# Patient Record
Sex: FEMALE | Race: WHITE | NOT HISPANIC OR LATINO | Employment: OTHER | URBAN - METROPOLITAN AREA
[De-identification: names, ages, dates, MRNs, and addresses within clinical notes are randomized per-mention and may not be internally consistent; named-entity substitution may affect disease eponyms.]

---

## 2018-06-26 ENCOUNTER — TRANSFERRED RECORDS (OUTPATIENT)
Dept: HEALTH INFORMATION MANAGEMENT | Facility: CLINIC | Age: 83
End: 2018-06-26

## 2018-06-27 ENCOUNTER — DOCUMENTATION ONLY (OUTPATIENT)
Dept: OTHER | Facility: CLINIC | Age: 83
End: 2018-06-27

## 2018-06-27 DIAGNOSIS — Z71.89 ADVANCED DIRECTIVES, COUNSELING/DISCUSSION: Chronic | ICD-10-CM

## 2018-08-21 ENCOUNTER — DOCUMENTATION ONLY (OUTPATIENT)
Dept: OTHER | Facility: CLINIC | Age: 83
End: 2018-08-21

## 2018-08-21 ENCOUNTER — HOSPITAL ENCOUNTER (OUTPATIENT)
Dept: NUCLEAR MEDICINE | Facility: CLINIC | Age: 83
Setting detail: NUCLEAR MEDICINE
Discharge: HOME | End: 2018-08-22
Attending: SPECIALIST | Admitting: SPECIALIST
Payer: COMMERCIAL

## 2018-08-21 DIAGNOSIS — E04.2 MULTINODULAR GOITER: ICD-10-CM

## 2018-08-21 DIAGNOSIS — E05.90 HYPERTHYROIDISM: ICD-10-CM

## 2018-08-21 PROBLEM — Z71.89 ADVANCED DIRECTIVES, COUNSELING/DISCUSSION: Chronic | Status: RESOLVED | Noted: 2018-06-27 | Resolved: 2018-08-21

## 2018-08-21 PROCEDURE — 34300033 ZZH RX 343

## 2018-08-21 PROCEDURE — A9516 IODINE I-123 SOD IODIDE MIC: HCPCS

## 2018-08-21 RX ADMIN — Medication 230 UCI.: at 13:30

## 2018-08-22 ENCOUNTER — HOSPITAL ENCOUNTER (OUTPATIENT)
Dept: NUCLEAR MEDICINE | Facility: CLINIC | Age: 83
Setting detail: NUCLEAR MEDICINE
End: 2018-08-22
Attending: SPECIALIST
Payer: COMMERCIAL

## 2018-08-22 PROCEDURE — 78014 THYROID IMAGING W/BLOOD FLOW: CPT

## 2018-09-04 ENCOUNTER — HOSPITAL ENCOUNTER (OUTPATIENT)
Dept: ULTRASOUND IMAGING | Facility: CLINIC | Age: 83
Discharge: HOME | End: 2018-09-04
Attending: PSYCHIATRY & NEUROLOGY | Admitting: PSYCHIATRY & NEUROLOGY
Payer: MEDICAID

## 2018-09-04 DIAGNOSIS — F01.50 VASCULAR DEMENTIA WITHOUT BEHAVIORAL DISTURBANCE (H): ICD-10-CM

## 2018-09-04 DIAGNOSIS — E88.9 TREMOR DUE TO METABOLIC DISORDER: ICD-10-CM

## 2018-09-04 DIAGNOSIS — R26.9 GAIT DISTURBANCE: ICD-10-CM

## 2018-09-04 DIAGNOSIS — R25.1 TREMOR DUE TO METABOLIC DISORDER: ICD-10-CM

## 2018-09-04 PROCEDURE — 93880 EXTRACRANIAL BILAT STUDY: CPT

## 2019-01-12 ENCOUNTER — APPOINTMENT (OUTPATIENT)
Dept: GENERAL RADIOLOGY | Facility: CLINIC | Age: 84
End: 2019-01-12
Payer: COMMERCIAL

## 2019-01-12 ENCOUNTER — HOSPITAL ENCOUNTER (INPATIENT)
Facility: CLINIC | Age: 84
LOS: 9 days | Discharge: NURSING, REHAB OR LONG-TERM CARE | End: 2019-01-24
Attending: EMERGENCY MEDICINE | Admitting: INTERNAL MEDICINE
Payer: COMMERCIAL

## 2019-01-12 DIAGNOSIS — S32.592A CLOSED FRACTURE OF RAMUS OF LEFT PUBIS, INITIAL ENCOUNTER (H): ICD-10-CM

## 2019-01-12 DIAGNOSIS — R11.2 NAUSEA AND VOMITING, INTRACTABILITY OF VOMITING NOT SPECIFIED, UNSPECIFIED VOMITING TYPE: ICD-10-CM

## 2019-01-12 DIAGNOSIS — T07.XXXA MULTIPLE FRACTURES: Primary | ICD-10-CM

## 2019-01-12 DIAGNOSIS — S42.202A CLOSED FRACTURE OF PROXIMAL END OF LEFT HUMERUS, UNSPECIFIED FRACTURE MORPHOLOGY, INITIAL ENCOUNTER: ICD-10-CM

## 2019-01-12 DIAGNOSIS — S52.502A CLOSED FRACTURE OF DISTAL END OF LEFT RADIUS, UNSPECIFIED FRACTURE MORPHOLOGY, INITIAL ENCOUNTER: ICD-10-CM

## 2019-01-12 DIAGNOSIS — K59.03 DRUG-INDUCED CONSTIPATION: ICD-10-CM

## 2019-01-12 DIAGNOSIS — S52.602A CLOSED FRACTURE OF DISTAL END OF LEFT ULNA, UNSPECIFIED FRACTURE MORPHOLOGY, INITIAL ENCOUNTER: ICD-10-CM

## 2019-01-12 DIAGNOSIS — D62 ANEMIA DUE TO BLOOD LOSS, ACUTE: ICD-10-CM

## 2019-01-12 LAB
ANION GAP SERPL CALCULATED.3IONS-SCNC: 9 MMOL/L (ref 3–14)
BASOPHILS # BLD AUTO: 0 10E9/L (ref 0–0.2)
BASOPHILS NFR BLD AUTO: 0.1 %
BUN SERPL-MCNC: 18 MG/DL (ref 7–30)
CALCIUM SERPL-MCNC: 8.2 MG/DL (ref 8.5–10.1)
CHLORIDE SERPL-SCNC: 104 MMOL/L (ref 94–109)
CO2 SERPL-SCNC: 27 MMOL/L (ref 20–32)
CREAT SERPL-MCNC: 0.66 MG/DL (ref 0.52–1.04)
DIFFERENTIAL METHOD BLD: ABNORMAL
EOSINOPHIL # BLD AUTO: 0.1 10E9/L (ref 0–0.7)
EOSINOPHIL NFR BLD AUTO: 0.3 %
ERYTHROCYTE [DISTWIDTH] IN BLOOD BY AUTOMATED COUNT: 14.5 % (ref 10–15)
GFR SERPL CREATININE-BSD FRML MDRD: 82 ML/MIN/{1.73_M2}
GLUCOSE SERPL-MCNC: 138 MG/DL (ref 70–99)
HCT VFR BLD AUTO: 32 % (ref 35–47)
HGB BLD-MCNC: 10.5 G/DL (ref 11.7–15.7)
IMM GRANULOCYTES # BLD: 0.1 10E9/L (ref 0–0.4)
IMM GRANULOCYTES NFR BLD: 1 %
LYMPHOCYTES # BLD AUTO: 0.8 10E9/L (ref 0.8–5.3)
LYMPHOCYTES NFR BLD AUTO: 5.6 %
MCH RBC QN AUTO: 28.5 PG (ref 26.5–33)
MCHC RBC AUTO-ENTMCNC: 32.8 G/DL (ref 31.5–36.5)
MCV RBC AUTO: 87 FL (ref 78–100)
MONOCYTES # BLD AUTO: 0.9 10E9/L (ref 0–1.3)
MONOCYTES NFR BLD AUTO: 6.3 %
NEUTROPHILS # BLD AUTO: 12.7 10E9/L (ref 1.6–8.3)
NEUTROPHILS NFR BLD AUTO: 86.7 %
NRBC # BLD AUTO: 0 10*3/UL
NRBC BLD AUTO-RTO: 0 /100
PLATELET # BLD AUTO: 223 10E9/L (ref 150–450)
POTASSIUM SERPL-SCNC: 3.5 MMOL/L (ref 3.4–5.3)
RBC # BLD AUTO: 3.69 10E12/L (ref 3.8–5.2)
SODIUM SERPL-SCNC: 140 MMOL/L (ref 133–144)
WBC # BLD AUTO: 14.7 10E9/L (ref 4–11)

## 2019-01-12 PROCEDURE — 0PSJXZZ REPOSITION LEFT RADIUS, EXTERNAL APPROACH: ICD-10-PCS | Performed by: EMERGENCY MEDICINE

## 2019-01-12 PROCEDURE — 99225 ZZC SUBSEQUENT OBSERVATION CARE,LEVEL II: CPT | Performed by: INTERNAL MEDICINE

## 2019-01-12 PROCEDURE — 99207 ZZC CDG-HISTORY COMP: MEETS EXP. PROBLEM FOCUSED - DOWN CODED LACK OF HPI: CPT | Performed by: INTERNAL MEDICINE

## 2019-01-12 PROCEDURE — 96374 THER/PROPH/DIAG INJ IV PUSH: CPT

## 2019-01-12 PROCEDURE — 99285 EMERGENCY DEPT VISIT HI MDM: CPT | Mod: 25

## 2019-01-12 PROCEDURE — 85025 COMPLETE CBC W/AUTO DIFF WBC: CPT | Performed by: EMERGENCY MEDICINE

## 2019-01-12 PROCEDURE — 96361 HYDRATE IV INFUSION ADD-ON: CPT

## 2019-01-12 PROCEDURE — G0378 HOSPITAL OBSERVATION PER HR: HCPCS

## 2019-01-12 PROCEDURE — 25000128 H RX IP 250 OP 636: Performed by: EMERGENCY MEDICINE

## 2019-01-12 PROCEDURE — 25530 CLTX ULNAR SHFT FX W/O MNPJ: CPT | Mod: LT

## 2019-01-12 PROCEDURE — 24500 CLTX HUMRL SHFT FX W/O MNPJ: CPT | Mod: LT

## 2019-01-12 PROCEDURE — 25505 CLTX RDL SHFT FX W/MNPJ: CPT | Mod: LT

## 2019-01-12 PROCEDURE — 96375 TX/PRO/DX INJ NEW DRUG ADDON: CPT

## 2019-01-12 PROCEDURE — 96376 TX/PRO/DX INJ SAME DRUG ADON: CPT

## 2019-01-12 PROCEDURE — 80048 BASIC METABOLIC PNL TOTAL CA: CPT | Performed by: EMERGENCY MEDICINE

## 2019-01-12 PROCEDURE — 25000128 H RX IP 250 OP 636: Performed by: INTERNAL MEDICINE

## 2019-01-12 PROCEDURE — 73502 X-RAY EXAM HIP UNI 2-3 VIEWS: CPT

## 2019-01-12 PROCEDURE — 25000132 ZZH RX MED GY IP 250 OP 250 PS 637: Performed by: INTERNAL MEDICINE

## 2019-01-12 PROCEDURE — 73080 X-RAY EXAM OF ELBOW: CPT | Mod: LT

## 2019-01-12 PROCEDURE — 73030 X-RAY EXAM OF SHOULDER: CPT | Mod: LT

## 2019-01-12 PROCEDURE — 73110 X-RAY EXAM OF WRIST: CPT | Mod: LT

## 2019-01-12 PROCEDURE — 40000986 XR WRIST LT  2 VW: Mod: LT

## 2019-01-12 RX ORDER — HYDROMORPHONE HYDROCHLORIDE 1 MG/ML
0.3 INJECTION, SOLUTION INTRAMUSCULAR; INTRAVENOUS; SUBCUTANEOUS ONCE
Status: COMPLETED | OUTPATIENT
Start: 2019-01-12 | End: 2019-01-12

## 2019-01-12 RX ORDER — SODIUM CHLORIDE 9 MG/ML
INJECTION, SOLUTION INTRAVENOUS CONTINUOUS
Status: DISCONTINUED | OUTPATIENT
Start: 2019-01-12 | End: 2019-01-15

## 2019-01-12 RX ORDER — OXYBUTYNIN CHLORIDE 5 MG/1
5 TABLET ORAL
Status: ON HOLD | COMMUNITY
End: 2019-01-21

## 2019-01-12 RX ORDER — BISACODYL 10 MG
10 SUPPOSITORY, RECTAL RECTAL DAILY PRN
Status: DISCONTINUED | OUTPATIENT
Start: 2019-01-12 | End: 2019-01-24 | Stop reason: HOSPADM

## 2019-01-12 RX ORDER — ONDANSETRON 4 MG/1
4 TABLET, ORALLY DISINTEGRATING ORAL EVERY 6 HOURS PRN
Status: DISCONTINUED | OUTPATIENT
Start: 2019-01-12 | End: 2019-01-24 | Stop reason: HOSPADM

## 2019-01-12 RX ORDER — TRAMADOL HYDROCHLORIDE 50 MG/1
100 TABLET ORAL EVERY 6 HOURS PRN
Status: DISCONTINUED | OUTPATIENT
Start: 2019-01-12 | End: 2019-01-14

## 2019-01-12 RX ORDER — ONDANSETRON 2 MG/ML
4 INJECTION INTRAMUSCULAR; INTRAVENOUS ONCE
Status: COMPLETED | OUTPATIENT
Start: 2019-01-12 | End: 2019-01-12

## 2019-01-12 RX ORDER — ACETAMINOPHEN 650 MG/1
650 SUPPOSITORY RECTAL EVERY 4 HOURS PRN
Status: DISCONTINUED | OUTPATIENT
Start: 2019-01-12 | End: 2019-01-14

## 2019-01-12 RX ORDER — LISINOPRIL 20 MG/1
20 TABLET ORAL EVERY EVENING
COMMUNITY

## 2019-01-12 RX ORDER — HYDRALAZINE HYDROCHLORIDE 20 MG/ML
10 INJECTION INTRAMUSCULAR; INTRAVENOUS EVERY 4 HOURS PRN
Status: DISCONTINUED | OUTPATIENT
Start: 2019-01-12 | End: 2019-01-24 | Stop reason: HOSPADM

## 2019-01-12 RX ORDER — NALOXONE HYDROCHLORIDE 0.4 MG/ML
.1-.4 INJECTION, SOLUTION INTRAMUSCULAR; INTRAVENOUS; SUBCUTANEOUS
Status: DISCONTINUED | OUTPATIENT
Start: 2019-01-12 | End: 2019-01-24 | Stop reason: HOSPADM

## 2019-01-12 RX ORDER — ACETAMINOPHEN 325 MG/1
650 TABLET ORAL EVERY 4 HOURS PRN
Status: DISCONTINUED | OUTPATIENT
Start: 2019-01-12 | End: 2019-01-14

## 2019-01-12 RX ORDER — ONDANSETRON 2 MG/ML
4 INJECTION INTRAMUSCULAR; INTRAVENOUS EVERY 6 HOURS PRN
Status: DISCONTINUED | OUTPATIENT
Start: 2019-01-12 | End: 2019-01-24 | Stop reason: HOSPADM

## 2019-01-12 RX ORDER — ACETAMINOPHEN 500 MG
1000 TABLET ORAL EVERY 8 HOURS
Status: DISCONTINUED | OUTPATIENT
Start: 2019-01-12 | End: 2019-01-24 | Stop reason: HOSPADM

## 2019-01-12 RX ORDER — POLYETHYLENE GLYCOL 3350 17 G/17G
17 POWDER, FOR SOLUTION ORAL DAILY PRN
Status: DISCONTINUED | OUTPATIENT
Start: 2019-01-12 | End: 2019-01-13

## 2019-01-12 RX ORDER — METHIMAZOLE 5 MG/1
5 TABLET ORAL
COMMUNITY

## 2019-01-12 RX ORDER — CITALOPRAM HYDROBROMIDE 10 MG/1
10 TABLET ORAL EVERY EVENING
COMMUNITY

## 2019-01-12 RX ADMIN — HYDROMORPHONE HYDROCHLORIDE 0.3 MG: 1 INJECTION, SOLUTION INTRAMUSCULAR; INTRAVENOUS; SUBCUTANEOUS at 17:05

## 2019-01-12 RX ADMIN — ACETAMINOPHEN 650 MG: 325 TABLET, FILM COATED ORAL at 23:08

## 2019-01-12 RX ADMIN — ONDANSETRON 4 MG: 2 INJECTION INTRAMUSCULAR; INTRAVENOUS at 19:54

## 2019-01-12 RX ADMIN — ONDANSETRON 4 MG: 2 INJECTION INTRAMUSCULAR; INTRAVENOUS at 17:58

## 2019-01-12 RX ADMIN — SODIUM CHLORIDE: 9 INJECTION, SOLUTION INTRAVENOUS at 21:38

## 2019-01-12 SDOH — HEALTH STABILITY: MENTAL HEALTH: HOW OFTEN DO YOU HAVE A DRINK CONTAINING ALCOHOL?: NEVER

## 2019-01-12 NOTE — ED PROVIDER NOTES
History     Chief Complaint:  Fall and Wrist Injury     HPI The history is limited due to the patient's dementia and is obtained through Farsi language interpretations provided by the patient's daughter.     Renetta Baker is a right-handed 83 year old female with a history of dementia and hypertension who presents via EMS accompanied by her daughter for evaluation of a left upper extremity injury and hip pain following a fall. Today shortly prior to arrival, the patient was at her home when she had an unwitnessed fall down 2-3 carpeted stairs injuring her left wrist, left shoulder, left elbow, left hip, and back. She sustained an injury with obvious deformity to the left wrist in the fall. She does not believe that she struck her head or lost consciousness in the fall. The patient's daughter found her shortly after the fall and called EMS to bring her into the ED for evaluation. She was provided Fentanyl and Zofran prior to arrival in the ED. She is not currently anticoagulated.     Allergies:  NKDA      Medications:    citalopram (CELEXA) 10 MG tablet  lisinopril (PRINIVIL/ZESTRIL) 20 MG tablet  methimazole (TAPAZOLE) 5 MG tablet  oxybutynin (DITROPAN) 5 MG tablet    Past Medical History:    Dementia  Hypertension   Osteoporosis     Past Surgical History:    History reviewed. No pertinent past surgical history.     Family History:    History reviewed. No pertinent family history.     Social History:  Tobacco use:    Never smoker   Alcohol use:    Negative   Marital status:       Accompanied to ED by:  EMS and daughter      Review of Systems   Unable to perform ROS: Dementia     Physical Exam   First Vitals:  BP: 169/81  Pulse: 63  Temp: 98.1  F (36.7  C)  Resp: 16  SpO2: 97 %    Physical Exam  General: Alert and cooperative with exam. Patient in moderate distress.  Baseline mentation.  Head:  Scalp is NC/AT  Eyes:  No scleral icterus, PERRL  ENT:  The external nose and ears are normal. The oropharynx  is normal and without erythema; mucus membranes are moist. Uvula midline, no evidence of deep space infection.  Neck:  Normal range of motion without rigidity.  CV:  Regular rate and rhythm  Resp:  Breath sounds are clear bilaterally    Non-labored, no retractions or accessory muscle use  GI:  Abdomen is soft, no distension, no tenderness. No peritoneal signs  MS:  Tenderness to palpation over left lateral hip, left shoulder, left elbow, and left wrist with obvious left wrist deformity. CMS intact to all four extremities.  Compartments soft and compressible.  Skin:  Warm and dry, No rash or lesions noted.  Neuro: Alert and cooperative.  No gross motor deficits. CN 2-12 intact    Emergency Department Course     Imaging:  Radiographic findings were communicated with the family who voiced understanding of the findings.    Elbow XR, Left:  IMPRESSION: Mild cortical irregularity along the radial head laterally. This could just be degenerative change. It is difficult to  completely exclude a small fracture in this region. There are other areas of degenerative osteophytes at the elbow.  Preliminary report per radiology.     Wrist XR, Left:  IMPRESSION: Acute impacted fracture of the distal left radius. Mild posterior angulation of the distal fragment. Acute fracture of the ulnar styloid.  Preliminary report per radiology.     Shoulder XR, Left:  IMPRESSION: Acute fracture through the proximal humeral metadiaphysis and humeral head. Impacted fracture fragments. No convincing dislocation.  Preliminary report per radiology.     XR Pelvis w Hip, Left:  IMPRESSION: There is cortical irregularity along the superior left pubic ramus. As such, cannot exclude fracture at this location. Both  hips appear located. Left proximal femoral surgical change appears anatomic without evidence for hardware failure.  Preliminary report per radiology.     Laboratory:  CBC: WBC 14.7 high, HGB 10.5 low, o/w WNL ()   BMP: Calcium 8.2 low,  Glucose 138 high, o/w WNL (Creatinine 0.66)    Procedures:  Procedure Note: Hematoma block:  PROCEDURE: Hematoma block left distal radius fracture    INDICATION:  Fracture distal radius    PHYSICIAN:  Aftab Almaguer DO  DESCRIPTION OF PROCEDURE:    Informed consent. Site was correctly identified. Anesthesia was obtained with 0.5% Bupivacaine without epinephrine, local infiltration.  Using a 18 gauge needle in standard fashion the distal radius fracture site was entered and 5cc of 0.5% bupivacaine introduced.  Patient tolerated the procedure well, no complications.      Procedure Note: Reduction of Fractured left distal radius  Physician: Aftab Almaguer DO   Diagnosis: Left distal radius fracture   Consent: Informed written, see paper chart  Description of Procedure: Consent as above.  Patient positioned in supine position.  Hematoma block was utilized, see above note.  The arm was grasped above and below fracture.  Recreating mechanism of injury the fracture area was reduced successfully.   The neurovascular exam was normal before and after reduction attempt.  The patient tolerated the procedure well, there were no complications.      Procedure Note: Splint Placement  Physician: Aftab Almaguer DO   Diagnosis: Left distal radius fracture   Consent: Informed written, see paper chart  Description of Procedure:  Following reduction of wrist fracture a sugar tong splint was applied (plaster) to the left upper extremity.  The elbow was maintained at 90 degrees flexion.  The neurovascular exam was normal before and after splint placement.  The patient tolerated the procedure well, there were no complications. A sling was applied.     Interventions:  1705 Dilaudid 0.3 mg IV   1758 Zofran 4 mg IV   1954 Zofran 4 mg IV     Emergency Department Course:  Patient was brought to the ED via EMS.     Nursing notes and vitals reviewed.  1652: I performed an exam of the patient as documented above.     1753: I  reassessed the patient and updated the family.    1800:  A hematoma block was performed as outlined in the procedure note above.  The patient tolerated well and there were no complications.      1856:  A fracture reduction and splint placement were performed as outlined in the procedure notes above.  The patient tolerated well and there were no complications.     1918: I spoke with Dr. Sosa of the hospitalist service regarding patient's presentation, findings, and plan of care.     Findings and plan explained to the Patient and family who consents to admission. Discussed the patient with Dr. Sosa, who will admit the patient to an obs bed for further monitoring, evaluation, and treatment.     Impression & Plan      Medical Decision Making:  Renetta Baker is a 83 year old female with a history of dementia who presents status post mechanical fall with associated left wrist/shoulder pain and left hip pain.  The patient's medical history and records were reviewed.  Initial consideration for, but not limited to, fracture, dislocation, soft tissue injury, among others.  Patient's neurologic exam is at baseline and there is no evidence of head injury; no indication for advanced head imaging at this time.  X-rays demonstrated proximal humerus fracture, ulnar styloid fracture, distal radius fracture, and likely pubic rami fracture as well as possible radial head fracture.  Patient was provided Dilaudid for additional pain control as well as Zofran for repeated episodes of emesis.  Baseline labs obtained demonstrate elevated white count; likely secondary to demargination from stress/pain reaction.  Hematoma block was performed for the left wrist fracture; reduction and splinting performed as noted above.  Given multiple fractures and need for ongoing pain and nausea management as well as need for likely TCU placement the patient will be admitted to observation with the hospitalist service for further evaluation and care.   No indication for orthopedic consultation in the ED.    Diagnosis:    ICD-10-CM    1. Closed fracture of proximal end of left humerus, unspecified fracture morphology, initial encounter S42.202A    2. Closed fracture of ramus of left pubis, initial encounter (H) S32.592A    3. Closed fracture of distal end of left radius, unspecified fracture morphology, initial encounter S52.502A    4. Closed fracture of distal end of left ulna, unspecified fracture morphology, initial encounter S52.602A    5. Nausea and vomiting, intractability of vomiting not specified, unspecified vomiting type R11.2        Disposition:  Admitted to obs with Dr. Sosa.       I, Juan Weber, am serving as a scribe at 4:53 PM on 1/12/2019 to document services personally performed by Aftab Almaguer DO based on my observations and the provider's statements to me.     EMERGENCY DEPARTMENT       Aftab Almaguer DO  01/12/19 2151

## 2019-01-12 NOTE — ED NOTES
Bed: [facility]  Expected date: 1/12/19  Expected time: 4:13 PM  Means of arrival: Ambulance  Comments:  [place] 83f ball, back, wrist pain ETA 1627

## 2019-01-13 ENCOUNTER — APPOINTMENT (OUTPATIENT)
Dept: PHYSICAL THERAPY | Facility: CLINIC | Age: 84
End: 2019-01-13
Payer: COMMERCIAL

## 2019-01-13 LAB
ANION GAP SERPL CALCULATED.3IONS-SCNC: 8 MMOL/L (ref 3–14)
BUN SERPL-MCNC: 26 MG/DL (ref 7–30)
CALCIUM SERPL-MCNC: 7.7 MG/DL (ref 8.5–10.1)
CHLORIDE SERPL-SCNC: 108 MMOL/L (ref 94–109)
CO2 SERPL-SCNC: 25 MMOL/L (ref 20–32)
CREAT SERPL-MCNC: 0.85 MG/DL (ref 0.52–1.04)
ERYTHROCYTE [DISTWIDTH] IN BLOOD BY AUTOMATED COUNT: 14.7 % (ref 10–15)
GFR SERPL CREATININE-BSD FRML MDRD: 64 ML/MIN/{1.73_M2}
GLUCOSE SERPL-MCNC: 131 MG/DL (ref 70–99)
HCT VFR BLD AUTO: 25.3 % (ref 35–47)
HGB BLD-MCNC: 8.5 G/DL (ref 11.7–15.7)
MCH RBC QN AUTO: 28.7 PG (ref 26.5–33)
MCHC RBC AUTO-ENTMCNC: 33.6 G/DL (ref 31.5–36.5)
MCV RBC AUTO: 86 FL (ref 78–100)
PLATELET # BLD AUTO: 161 10E9/L (ref 150–450)
POTASSIUM SERPL-SCNC: 4.5 MMOL/L (ref 3.4–5.3)
RBC # BLD AUTO: 2.96 10E12/L (ref 3.8–5.2)
SODIUM SERPL-SCNC: 141 MMOL/L (ref 133–144)
WBC # BLD AUTO: 7 10E9/L (ref 4–11)

## 2019-01-13 PROCEDURE — 85027 COMPLETE CBC AUTOMATED: CPT | Performed by: INTERNAL MEDICINE

## 2019-01-13 PROCEDURE — 97162 PT EVAL MOD COMPLEX 30 MIN: CPT | Mod: GP

## 2019-01-13 PROCEDURE — 99207 ZZC CDG-MDM COMPONENT: MEETS LOW - DOWN CODED: CPT | Performed by: INTERNAL MEDICINE

## 2019-01-13 PROCEDURE — 36415 COLL VENOUS BLD VENIPUNCTURE: CPT | Performed by: INTERNAL MEDICINE

## 2019-01-13 PROCEDURE — 25000132 ZZH RX MED GY IP 250 OP 250 PS 637: Performed by: INTERNAL MEDICINE

## 2019-01-13 PROCEDURE — 40000193 ZZH STATISTIC PT WARD VISIT

## 2019-01-13 PROCEDURE — 80048 BASIC METABOLIC PNL TOTAL CA: CPT | Performed by: INTERNAL MEDICINE

## 2019-01-13 PROCEDURE — 99225 ZZC SUBSEQUENT OBSERVATION CARE,LEVEL II: CPT | Performed by: INTERNAL MEDICINE

## 2019-01-13 PROCEDURE — 25000132 ZZH RX MED GY IP 250 OP 250 PS 637: Performed by: SURGERY

## 2019-01-13 PROCEDURE — 96361 HYDRATE IV INFUSION ADD-ON: CPT

## 2019-01-13 PROCEDURE — G0378 HOSPITAL OBSERVATION PER HR: HCPCS

## 2019-01-13 PROCEDURE — 25000128 H RX IP 250 OP 636: Performed by: INTERNAL MEDICINE

## 2019-01-13 RX ORDER — POLYETHYLENE GLYCOL 3350 17 G/17G
17 POWDER, FOR SOLUTION ORAL DAILY
Status: DISCONTINUED | OUTPATIENT
Start: 2019-01-13 | End: 2019-01-13

## 2019-01-13 RX ORDER — POLYETHYLENE GLYCOL 3350 17 G/17G
17 POWDER, FOR SOLUTION ORAL DAILY
Status: DISCONTINUED | OUTPATIENT
Start: 2019-01-13 | End: 2019-01-24 | Stop reason: HOSPADM

## 2019-01-13 RX ADMIN — ACETAMINOPHEN 1000 MG: 500 TABLET, FILM COATED ORAL at 05:54

## 2019-01-13 RX ADMIN — RANITIDINE 150 MG: 150 TABLET ORAL at 12:06

## 2019-01-13 RX ADMIN — ACETAMINOPHEN 650 MG: 325 TABLET, FILM COATED ORAL at 12:26

## 2019-01-13 RX ADMIN — SODIUM CHLORIDE: 9 INJECTION, SOLUTION INTRAVENOUS at 08:54

## 2019-01-13 RX ADMIN — TRAMADOL HYDROCHLORIDE 100 MG: 50 TABLET, COATED ORAL at 21:08

## 2019-01-13 RX ADMIN — RANITIDINE 150 MG: 150 TABLET ORAL at 21:03

## 2019-01-13 RX ADMIN — ACETAMINOPHEN 1000 MG: 500 TABLET, FILM COATED ORAL at 15:11

## 2019-01-13 NOTE — PROGRESS NOTES
Alert. Farsi speaking,  requested for AM. L arm/wrist in splint and sling. Pain controlled with tylenol, declined tramadol. /hr, NPO, ortho/trauma consults, PT, requests female aids only, continue to monitor

## 2019-01-13 NOTE — PHARMACY-ADMISSION MEDICATION HISTORY
Admission medication history interview status for the 1/12/2019  admission is complete. See EPIC admission navigator for prior to admission medications     Medication history source reliability:Good- daughter    Actions taken by pharmacist (provider contacted, etc):None     Additional medication history information not noted on PTA med list :None    Medication reconciliation/reorder completed by provider prior to medication history? No    Time spent in this activity: 10 min    Prior to Admission medications    Medication Sig Last Dose Taking? Auth Provider   ALENDRONATE SODIUM PO Take by mouth once a week 1/8/2019 Yes Unknown, Entered By History   citalopram (CELEXA) 10 MG tablet Take 10 mg by mouth every evening  1/11/2019 at pm Yes Reported, Patient   lisinopril (PRINIVIL/ZESTRIL) 20 MG tablet Take 20 mg by mouth every evening  1/11/2019 at pm Yes Reported, Patient   methimazole (TAPAZOLE) 5 MG tablet Take 5 mg by mouth every morning (before breakfast)  1/12/2019 at am Yes Reported, Patient   oxybutynin (DITROPAN) 5 MG tablet Take 5 mg by mouth every morning (before breakfast)  1/12/2019 at am Yes Reported, Patient

## 2019-01-13 NOTE — PROGRESS NOTES
[facility]    Medicine Progress Note - Hospitalist Service       Date of Admission:  1/12/2019  Assessment & Plan   Renetta Baker is an 83 year old female with a history of dementia and HTN who presented after a mechanical fall sustaining a left distal radius fracture, left proximal humerus fracture and left pubic ramus fracture    Mechanical fall   Left distal radius fracture s/p reduction   Left proximal humerus fracture  Left pubic ramus fracture     Status post a mechanical fall resulting in a left wrist fracture needing reduction and left proximal humerus fracture and left pubic ramus fracture.  In the ED the wrist fracture was reduced and patient was placed in a splint  - Trauma surgery evaluation.  At this time will defer surgical management to orthopedic surgery  - Pain control with Tylenol 1000 mg TID   - Tramadol PRN   - PT consulted  - Orthopedic surgery consulted and appreciate their assistance     Hypertension  Blood pressure on the low side despite holding PTA Lisinopril  - Continue to hold Lisinopril.  If pressures remain soft will likely discontinue at discharge     History of depression  - Holding PTA Celexa.  Can restart once allowed to eat     Hyperthyroidism   - Holding PTA Methimazole while NPO      History of urinary incontinence  - Holding PTA Oxybutynin while NPO         Diet: NPO for Medical/Clinical Reasons Except for: Ice Chips    DVT Prophylaxis: Pneumatic Compression Devices  Chatterjee Catheter: not present  Code Status: Full Code      Disposition Plan   Expected discharge: TBD.  If patient to undergo surgery will switch to inpatient.  If no surgery then await PT evaluation.  Likely TCU placement  Entered: Yung Jerez DO 01/13/2019, 12:29 PM       The patient's care was discussed with the Bedside Nurse, Care Coordinator/, Patient and Patient's Family.    Yung Jerez DO  Hospitalist Service  [facility]  [facility]    ______________________________________________________________________    Interval History   Patient seen and examined.  Still complaining of left shoulder pain.  No fevers or chills.  No acute events.  Patient is pleasantly demented    Data reviewed today: I reviewed all medications, new labs and imaging results over the last 24 hours. I personally reviewed no images or EKG's today.    Physical Exam   Vital Signs: Temp: 98.3  F (36.8  C) Temp src: Oral BP: 98/54 Pulse: 66   Resp: 16 SpO2: 97 % O2 Device: None (Room air)    Weight: 113 lbs 14.4 oz  General Appearance: Pleasantly demented.  NAD  Respiratory: Clear to auscultation.  No respiratory distress  Cardiovascular: RRR.  No murmurs  GI: Bowel sounds present.  No tenderness  Skin: Bruising to anterior left shoulder noted.  No rashes  Other: Left arm in a sling.  No lower extremity edema     Data   Recent Labs   Lab 01/13/19  0638 01/12/19  1800   WBC 7.0 14.7*   HGB 8.5* 10.5*   MCV 86 87    223    140   POTASSIUM 4.5 3.5   CHLORIDE 108 104   CO2 25 27   BUN 26 18   CR 0.85 0.66   ANIONGAP 8 9   VICENTE 7.7* 8.2*   * 138*     Recent Results (from the past 24 hour(s))   XR Pelvis w Hip Left 1 View    Narrative    PELVIS AND HIP LEFT ONE VIEW   1/12/2019 5:48 PM     HISTORY: Fall, hip pain.    COMPARISON: None.      Impression    IMPRESSION: There is cortical irregularity along the superior left  pubic ramus. As such, cannot exclude fracture at this location. Both  hips appear located. Left proximal femoral surgical change appears  anatomic without evidence for hardware failure.    KILO SIMPSON MD   XR Shoulder Left 2 Views    Narrative    SHOULDER TWO VIEW LEFT   1/12/2019 5:50 PM     HISTORY: Fall, shoulder pain.    COMPARISON: None.      Impression    IMPRESSION: Acute fracture through the proximal humeral metadiaphysis  and humeral head. Impacted fracture fragments. No convincing  dislocation.    KILO SIMPSON MD   XR Wrist Left G/E 3  Views    Narrative    WRIST LEFT THREE OR MORE VIEWS   1/12/2019 5:52 PM     HISTORY: Fall, wrist pain, deformity.    COMPARISON: None.      Impression    IMPRESSION: Acute impacted fracture of the distal left radius. Mild  posterior angulation of the distal fragment. Acute fracture of the  ulnar styloid.    KILO SIMPSON MD   Elbow  XR, G/E 3 views, left    Narrative    ELBOW LEFT THREE OR MORE VIEWS   1/12/2019 5:56 PM     HISTORY: Fall, elbow pain.    COMPARISON: None.      Impression    IMPRESSION: Mild cortical irregularity along the radial head  laterally. This could just be degenerative change. It is difficult to  completely exclude a small fracture in this region. There are other  areas of degenerative osteophytes at the elbow.    KILO SIMPSON MD   XR Wrist Left 2 Views    Narrative    WRIST LEFT TWO VIEW 1/12/2019 9:10 PM     HISTORY: Status post reduction.    COMPARISON: Earlier the same day.      Impression    IMPRESSION: New casting material over the left wrist limits  evaluation. The lateral view is nondiagnostic.    KATY QUEZADA MD

## 2019-01-13 NOTE — ED NOTES
Due to pt's sensativity to pain medications daughters report we should wait before giving pt more pain medication. Pt has had multiple episodes of emesis after dilaudid. RN offered something more mild like tylenol/ibuprofen and they still declined. Daughters report pt is not complaining of pain at this time

## 2019-01-13 NOTE — H&P
Admitted:     01/12/2019      PRIMARY CARE PHYSICIAN:  Dr. Isauro Fuller.      CHIEF COMPLAINT:  Status post mechanical fall and multiple fractures.      HISTORY OF PRESENT ILLNESS:  Ms. Renetta Baker is an 83-year-old  female with history of dementia, hypertension, depression, and urinary incontinence who was brought into the [facility] Emergency Room via EMS after a fall.  The patient was at her home with her daughter and she had an unwitnessed fall down 2-3 carpeted stairs while she was going down the stairs, injuring her left wrist and left shoulder.  She sustained an injury to her left wrist with a deformity in the fall.  No head injury or loss of consciousness at the time of the fall.  Daughter found her shortly and called EMS and she was brought in to the emergency room.  In the emergency room she was given fentanyl and Zofran and x-rays of the wrist showed an acute impacted fracture of the distal left radius, mild posterior angulation of the distal fragment, acute fracture of the ulnar styloid and acute fracture through the proximal humeral head and metadiaphysis impacted fracture fragments.  No convincing dislocation.  An x-ray of the left hip showed there is a cortical irregularity along the superior left pubic ramus.  Cannot exclude fracture or dislocation.  Both hips appear located.  Left proximal femoral surgical change appears anatomic without evidence of hardware failure.  So, after a block to the left distal radius, reduction of the left wrist fracture was done by Dr. Almaguer and her left shoulder was placed in a splint and a request for hospitalization was made for pain control and safe discharge disposition and orthopedic surgery evaluation so she is getting admitted as observation.      PAST MEDICAL HISTORY:  Significant for:   1.  Dementia.   2.  Hypertension.   3.  History of urinary incontinence.   4.  Depression.      ALLERGIES:  NO KNOWN DRUG ALLERGIES.      SOCIAL  HISTORY:  Not a smoker, no alcohol use.  She is .  Lives with her daughter currently.      FAMILY HISTORY:  Both parents  of heart issues.      REVIEW OF SYSTEMS:  Ten-point review of systems was done and is negative except as in HPI.      PHYSICAL EXAMINATION:   VITAL SIGNS:  Temperature is 98.1 degrees Fahrenheit, pulse rate is 63-75, blood pressure is 148/82.  She is satting 97% on room air.  Respiratory rate is 16.   GENERAL:  She is an alert, oriented, pleasant female lying comfortably in bed, not in acute distress.     HEENT:  No scleral icterus or conjunctival injection.  Pupils equal, round and reactive to light.  Oropharyngeal exam showed dry mucous membranes.   HEART:  S1, S2 heard, no murmurs, rubs or gallops.   LUNGS:  Clear to auscultation.  No rales, rhonchi or wheezing.     EXTREMITIES:  Left shoulder is in a splint.   ABDOMEN:  Soft, nontender, nondistended, no hepatosplenomegaly.   SKIN:  Warm and dry.   PSYCHIATRIC:  Mood and affect are normal.      LABORATORY AND RADIOLOGICAL INVESTIGATIONS:  As dictated above.  Her CBC showed WBC count of 14.7, hemoglobin at 10.5, platelet count at 223.  Sodium 140, potassium 3.5, chloride 104, bicarbonate 27, , creatinine 0.66, glucose at 138.      ASSESSMENT AND PLAN:     1.  An 83-year-old female status post a mechanical fall resulting in a left wrist fracture needing reduction and left proximal humerus fracture and left pubic ramus fracture, so she will be hospitalized.  Due to the multiple nature of fractures, we will request Trauma Surgery evaluation and Orthopedic Surgery evaluation.  With the pubic ramus fracture, usual weightbearing as tolerated, which will be ordered.  Pain control with Tylenol 1000 mg p.o. t.i.d. and as per the family, patient is very sensitive to pain medications so Tramadol 100 mg q.6h. p.r.n. for pain will be ordered.  PT consultation will be obtained regarding safe discharge disposition.   2.  Hypertension.  We  will hold the lisinopril as patient is getting admitted as observation.   3.  History of depression.  We will hold Celexa for now.   4.  History of hypothyroidism.  We will hold the methimazole as patient is getting admitted as observation.   5.  History of urinary incontinence.  We will hold the oxybutynin as patient is getting admitted as observation.  Hydralazine will be used p.r.n. for blood pressure.      CODE STATUS:  Discussed with the patient and the family.  They want her to be full code.      DISPOSITION:  She will be admitted as observation as I am anticipating a short stay.         KANE JULIO MD             D: 2019   T: 2019   MT: KAITY      Name:     MAGDALENE GRIFFITH   MRN:      [id number]        Account:      [id number]   :      1935        Admitted:     2019                   Document: [id number]       cc: Isauro Fuller MD

## 2019-01-13 NOTE — PLAN OF CARE
"Discharge Planner PT   Patient plan for discharge: Pt did not verbalized. Family stated \"TCU\"  Current status: PT eval completed. Pt is a 83 yr old female with history of dementia and HTN presents with a mechanical fall sustaining a left distal radius fracture, left proximal humerus fracture and left pubic ramus fracture. Ortho has been consult and have decided on non-operative management. Pt is to be WBAT in BLE's and NWB on LUE. Pt is to have LUE in a sling x 6 weeks. Pt lives with her daughter in a 2SH with 2 MAYCOL. Daughter works during the day. Pt's bed and bathroom in the basement with a flight of stairs and 1 rail support. Pt required mod assist with bathing and dressing at home. Pt was mod I with ambulation using furniture to hold on or family members. Pt owns a std cane at home and family reported 2 falls within the past 6 months.  Pt was received in bed and family was present. Daughters assisted with translation. Pt and family is educated on WB precautions per otho. Pt requires max assist x 1 with rolling to the right and supine>sit. Pt sat at EOB x12 min with SBA-CGA for safety. Pt stood with max assist x 1 for < 10 sec. Pt was assisted back to bed with max assist x 2 for safety.    Pt is admitted under OBS status. PT will complete orders and defer all interventions to TCU.   Barriers to return to prior living situation: Fall risk, 1-2 person assist required for bed mobility and transfers, WB status, Pain, Steps to enter house, Steps to manage at home.   Recommendations for discharge: TCU  Rationale for recommendations: Pt will need TCU stay to achieve increased strength, activity tolerance and independence with functional mobility piror to returning home.          Entered by: Enedina Souza 01/13/2019 4:59 PM       "

## 2019-01-13 NOTE — PLAN OF CARE
Observation goals PRIOR TO DISCHARGE     Comments: -diagnostic tests and consults completed and resulted - partially met, PT today and XR scheduled   -vital signs normal or at patient baseline - met  Nurse to notify provider when observation goals have been met and patient is ready for discharge.

## 2019-01-13 NOTE — ED NOTES
"[facility]  ED Nurse Handoff Report    ED Chief complaint: Fall (pt had unwitnessed fall at home down 2-3 carpeted stairs, left wrist pain and back pain no LOC)      ED Diagnosis:   Final diagnoses:   Closed fracture of proximal end of left humerus, unspecified fracture morphology, initial encounter   Closed fracture of ramus of left pubis, initial encounter (H)   Closed fracture of distal end of left radius, unspecified fracture morphology, initial encounter   Closed fracture of distal end of left ulna, unspecified fracture morphology, initial encounter   Nausea and vomiting, intractability of vomiting not specified, unspecified vomiting type       Code Status: Hospital MD to address     Allergies: No Known Allergies    Activity level - Baseline/Home:  Independent    Activity Level - Current:   Stand with Assist of 2     Needed?: Yes    Isolation: No  Infection: Not Applicable  Bariatric?: No    Vital Signs:   Vitals:    01/12/19 1815 01/12/19 1830 01/12/19 1837 01/12/19 1838   BP:       Pulse:       Resp:       Temp:       TempSrc:       SpO2: 98% 98% 93% 94%       Cardiac Rhythm: ,        Pain level: 0-10 Pain Scale: 5    Is this patient confused?: Yes, dementia. Pt is nonEnglish speaking and so hard to assess orientation  Does this patient have a guardian?  Yes         If yes, is there guardianship documents in the Epic \"Code/ACP\" activity?  Yes         Guardian Notified?  Yes  [place] - Suicide Severity Rating Scale Completed?  Yes  If yes, what color did the patient score?  White    Patient Report: Initial Complaint: Fall, L shoulder, L wrist and L hip pain  Focused Assessment: Pt presents from home where she lives with daughter. Pt has dementia so her daughter cares for her 24/7. Today, pt had a fall down 2-3 carpeted steps in their home. Pt does have osteo and was complaining of L shoulder, L wrist and L hip pain. Here, significant swelling and bruising to shoulder and deformity " to wrist. L shoulder is broken and L wrist also broken. L hip is unable to be ruled out as a definitive fracture. Wrist was numbed and reduced in ED. Pt given IV dilaudid and she had multiple episodes of emesis afterwards. Daughters report she is very sensitive to pain meds. Daughters are here caring and interpreting for the patient. VSS   Tests Performed: Blood work, xrays  Abnormal Results: L humeral fx, L wrist fx, possible L hip fx  Treatments provided: 0.5 mg dilaudid, zofran, ems gave 25 mcg fentanyl     Family Comments: daughters present    OBS brochure/video discussed/provided to patient/family: Yes              Name of person given brochure if not patient: daughter              Relationship to patient: daughter    ED Medications:   Medications   HYDROmorphone (PF) (DILAUDID) injection 0.3 mg (0.3 mg Intravenous Given 1/12/19 1705)   ondansetron (ZOFRAN) injection 4 mg (4 mg Intravenous Given 1/12/19 1758)       Drips infusing?:  No    For the majority of the shift this patient was Green.   Interventions performed were meds, repo, splint/sling.    Severe Sepsis OR Septic Shock Diagnosis Present: No    To be done/followed up on inpatient unit:  inpt orders    ED NURSE PHONE NUMBER: *[phone]

## 2019-01-13 NOTE — PROGRESS NOTES
Patient seen. Formal consult to follow.     Patient can have regular diet.   Non operative management.   WBAT w/ walker   Will obtain one more XR of wrist.   Pain control.   Sling to LUE x 6 weeks.   F/U with [facility] as outpatient.     Suzanne Valdes PA-C on 1/13/2019 at 2:12 PM   Orthopedics

## 2019-01-13 NOTE — PLAN OF CARE
Observation goals PRIOR TO DISCHARGE     Comments: -diagnostic tests and consults completed and resulted - not met, ortho and surgical consult, PT today  -vital signs normal or at patient baseline - met  Nurse to notify provider when observation goals have been met and patient is ready for discharge.

## 2019-01-13 NOTE — CONSULTS
General Surgery Consultation     Renetta Baker MRN# [id number]   YOB: 1935 Age: 83 year old   Date of Admission: 1/12/2019     Reason for consult: I was asked by Dr. Sosa to evaluate this patient for fall with multiple orthopedic injuries.           Assessment and Plan:   Active Problems:    Multiple fractures    Assessment: left wrist fracture - reduced/plinted  Proximal humerus fracture - on shoulder sling now ortho eval pending  Concern of left pubic ramus fracture - ortho eval pending  No evidence of abdominal or thoracic injury      Plan: OK to feed after ortho evaluation  Bowel regimen, IS, DVT and PU prophylaxis in place               Chief Complaint:   Fall down 2-3 steps     History is obtained from the patient, electronic health record and emergency department physician         History of Present Illness:   This patient is a 83 year old female who presents with falling down a 2-3 steps, carpeted, leading to multiple locations on the left upper extremity and maybe to the left pubic ramus.            Past Medical History:     Past Medical History:   Diagnosis Date     Dementia      Hypertension              Past Surgical History:   No past surgical history on file.             Social History:   I have reviewed this patient's social history          Family History:   I have reviewed this patient's family history          Immunizations:     There is no immunization history on file for this patient.          Allergies:   No Known Allergies          Medications:     Medications Prior to Admission   Medication Sig Dispense Refill Last Dose     ALENDRONATE SODIUM PO Take by mouth once a week   1/8/2019     citalopram (CELEXA) 10 MG tablet Take 10 mg by mouth every evening    1/11/2019 at pm     lisinopril (PRINIVIL/ZESTRIL) 20 MG tablet Take 20 mg by mouth every evening    1/11/2019 at pm     methimazole (TAPAZOLE) 5 MG tablet Take 5 mg by mouth every morning (before breakfast)    1/12/2019  at am     oxybutynin (DITROPAN) 5 MG tablet Take 5 mg by mouth every morning (before breakfast)    1/12/2019 at am             Review of Systems:   The 5 point Review of Systems is negative other than noted in the HPI           Physical Exam:   Vitals were reviewed  Constitutional:   awake, alert, cooperative, mild distress, appears stated age and normal weight     Eyes:   sclera clear     Lungs:   no increased work of breathing, good air exchange, no retractions     Abdomen:   no scars, soft, non-distended, non-tender, no masses palpated and ascites absent     Musculoskeletal:   tone is normal     Skin:   normal skin color, texture, turgor             Data:   All laboratory and imaging data in the past 24 hours reviewed

## 2019-01-13 NOTE — PROGRESS NOTES
Observation goals PRIOR TO DISCHARGE    Comments:   -diagnostic tests and consults completed and resulted: Not met    -vital signs normal or at patient baseline : Met

## 2019-01-13 NOTE — PLAN OF CARE
Pt is alert, speaks Farsi. Daughter's at bedside.  waiver done, signed, in chart. Left wrist, left shoulder, pubis fracture. Left wrist splint intact, arm in sling. CMS intact, swelling and bruising noted. PCD in place. IS instructions given, pt understands and demonstrated correct use of IS. Xray scheduled for today. Pt on regular diet, needs help ordering. IV fluids @100ml/hr. Using bedpan. Pt requests only female aids. For pain scheduled tylenol. Will continue to monitor.

## 2019-01-13 NOTE — PLAN OF CARE
Observation goals PRIOR TO DISCHARGE     Comments: -diagnostic tests and consults completed and resulted - not met  -vital signs normal or at patient baseline - met  Nurse to notify provider when observation goals have been met and patient is ready for discharge.

## 2019-01-13 NOTE — PLAN OF CARE
Pt arrived from ED at 2030hrs. Alert.Farsi speaking.  will be requested in the AM. L wrist w/ splint,L arm on a sling. Tylenol for pain per family requested. Declined tramadol. N/V, zofran given in ED. IVF infusing as ordered. Incontinent of bladder.

## 2019-01-13 NOTE — PROGRESS NOTES
01/13/19 1410   Quick Adds   Type of Visit Initial PT Evaluation       Present no   Language farsi, family signed waiver and daughters were present to interpret.   Living Environment   Lives With child(ariel), adult   Living Arrangements house   Home Accessibility stairs to enter home;stairs within home   Living Environment Comment Pt lives with daughter in a  2Sh with bed and bathroom in the basement with rail support. Has 2 MAYCOL without rails support   Self-Care   Usual Activity Tolerance moderate   Current Activity Tolerance poor   Equipment Currently Used at Home none   Activity/Exercise/Self-Care Comment Pt required mod assist with dressing and bathing at home. Daughter would assist with these ADL's.    Functional Level Prior   Ambulation 1-->assistive equipment   Transferring 0-->independent   Toileting 0-->independent   Bathing 0-->independent   Communication 0-->understands/communicates without difficulty   Swallowing 0-->swallows foods/liquids without difficulty   Cognition 1 - attention or memory deficits   Fall history within last six months yes   Number of times patient has fallen within last six months 2   Which of the above functional risks had a recent onset or change? ambulation;transferring   Prior Functional Level Comment Pt was mod I with ambulation relying on furnitures at home or family members. Pt owns a std cane.    General Information   Onset of Illness/Injury or Date of Surgery - Date 01/12/19   Referring Physician Haley Sosa MD   Patient/Family Goals Statement Be able to ambulate again.   Pertinent History of Current Problem (include personal factors and/or comorbidities that impact the POC) 83 year old female with a history of dementia and HTN who presented after a mechanical fall sustaining a left distal radius fracture, left proximal humerus fracture and left pubic ramus fracture. Ortho has been consulted and non-surgical management in indicated at this time.   "  Precautions/Limitations fall precautions   Weight-Bearing Status - LUE nonweight-bearing   Weight-Bearing Status - RUE full weight-bearing   Weight-Bearing Status - LLE weight-bearing as tolerated   Weight-Bearing Status - RLE weight-bearing as tolerated   General Observations Pleasant and cooperative   General Info Comments Activity: WBAT to BLE's, NWB on LUE, LUE to be in sling x 6 weeks   Cognitive Status Examination   Orientation person   Level of Consciousness alert;confused   Follows Commands and Answers Questions 75% of the time  (with  assistance)   Personal Safety and Judgment impaired   Memory impaired   Pain Assessment   Patient Currently in Pain Yes, see Vital Sign flowsheet  (Pt c/o \"severe\" pain in the LLE with bed mob. RN aware.)   Posture    Posture Comments LUE in sling. Pt and family is educated on keeping the LUE in sling x 6 weeks per ortho orders.    Range of Motion (ROM)   ROM Comment BLE: WFL, except for L hip: NT, pt c/o pain with ROM of L hip   Strength   Strength Comments L hip: NT, otherwise: grossly 4-/5    Bed Mobility   Bed Mobility Comments Rolling to Right: max assist x 1, R sidelying >sit: max assist x 1 with verbaal cues on safety. Sit>supine: max assist x 2 for safety.    Transfer Skills   Transfer Comments STS with max assist x 1 and verbal cues.   Gait   Gait Comments Unable to initate.   Balance   Balance Comments SItting: good, standing: poor   Sensory Examination   Sensory Perception no deficits were identified   Coordination   Coordination Comments uanble to assess   Muscle Tone   Muscle Tone no deficits were identified   Clinical Impression   Criteria for Skilled Therapeutic Intervention evaluation only   Clinical Presentation Evolving/Changing   Clinical Presentation Rationale Current clinical and fucntional presentation   Clinical Decision Making (Complexity) Low complexity   Anticipated Discharge Disposition Transitional Care Facility   Risk & Benefits of " "therapy have been explained Yes   Patient, Family & other staff in agreement with plan of care Yes   Clinical Impression Comments Pt presents with pain, decreased activity tolerance, decreased strength and balance limiting indepdence with fucntional mobility. Pt needs 1-2 person assist for all mobility and will need TCU prior to discharging home. Pt is admitted under OBS status and therefore all PT interventions are deffered to TCU at this time.     [facility] AM-PAC  \"6 Clicks\" V.2 Basic Mobility Inpatient Short Form   1. Turning from your back to your side while in a flat bed without using bedrails? 2 - A Lot   2. Moving from lying on your back to sitting on the side of a flat bed without using bedrails? 2 - A Lot   3. Moving to and from a bed to a chair (including a wheelchair)? 2 - A Lot   4. Standing up from a chair using your arms (e.g., wheelchair, or bedside chair)? 2 - A Lot   5. To walk in hospital room? 1 - Total   6. Climbing 3-5 steps with a railing? 1 - Total   Basic Mobility Raw Score (Score out of 24.Lower scores equate to lower levels of function) 10   Total Evaluation Time   Total Evaluation Time (Minutes) 30     "

## 2019-01-13 NOTE — CONSULTS
[facility] Orthopedic Consultation    Renetta Baker MRN# [id number]   Age: 83 year old YOB: 1935     Date of Admission:  1/12/2019    Reason for consult: Left wrist fracture, left humerus fracture, left pubic ramus fracture          Requesting physician: Dr. Sosa       Level of consult: One-time consult to assist in determining a diagnosis and to recommend an appropriate treatment plan           Assessment and Plan:   Assessment:   Left distal radius fracture  Left proximal humerus fracture, impacted  Left superior pubic rami fracture; s/p left sliding hip screw with no evidence of loosening        Plan:   Non operative management   WBAT bed to chair transfers x 6 weeks  Sling to LUE x 6 weeks   NWB to LUE x 6 weeks  Ice  Follow up with Dr. Herrera in clinic in 2 weeks   Follow up with PCP for osteoporosis evaluation            Chief Complaint:   Mechanical fall          History of Present Illness:   This patient is a 83 year old female who presents with the following condition requiring a hospital admission:    Patient with PMH of dementia and HTN who presented to the ED yesterday for evaluation after a fall. The patient lives at home with her daughter and sustained an unwitnessed fall down a couple of stairs. XRs were obtained in the ED which demonstrated left radius, humerus, and pubic ramus fracture. The patient currently voices of pain to her left should and left inner thigh. She voices no other injuries at this time.           Past Medical History:     Past Medical History:   Diagnosis Date     Dementia      Hypertension              Past Surgical History:   No past surgical history on file.          Social History:     Social History     Tobacco Use     Smoking status: Never Smoker   Substance Use Topics     Alcohol use: No     Frequency: Never             Family History:   No family history on file.          Immunizations:     VACCINE/DOSE   Diptheria   DPT   DTAP   HBIG    Hepatitis A   Hepatitis B   HIB   Influenza   Measles   Meningococcal   MMR   Mumps   Pneumococcal   Polio   Rubella   Small Pox   TDAP   Varicella   Zoster             Allergies:   No Known Allergies          Medications:     Current Facility-Administered Medications   Medication     acetaminophen (TYLENOL) Suppository 650 mg     acetaminophen (TYLENOL) tablet 1,000 mg     acetaminophen (TYLENOL) tablet 650 mg     bisacodyl (DULCOLAX) Suppository 10 mg     hydrALAZINE (APRESOLINE) injection 10 mg     melatonin tablet 1 mg     naloxone (NARCAN) injection 0.1-0.4 mg     ondansetron (ZOFRAN-ODT) ODT tab 4 mg    Or     ondansetron (ZOFRAN) injection 4 mg     polyethylene glycol (MIRALAX/GLYCOLAX) Packet 17 g     ranitidine (ZANTAC) tablet 150 mg     sodium chloride 0.9% infusion     traMADol (ULTRAM) tablet 100 mg             Review of Systems:   CV: NEGATIVE for chest pain, palpitations or peripheral edema  C: NEGATIVE for fever, chills, change in weight  E/M: NEGATIVE for ear, mouth and throat problems  R: NEGATIVE for significant cough or SOB    10 point review of systems negative aside from HPI and physical exam.            Physical Exam:   All vitals have been reviewed  Patient Vitals for the past 24 hrs:   BP Temp Temp src Pulse Resp SpO2 Weight   01/13/19 1640 141/60 97.9  F (36.6  C) Oral 67 16 97 % --   01/13/19 0821 98/54 98.3  F (36.8  C) Oral 66 16 97 % --   01/13/19 0041 113/67 96.4  F (35.8  C) Oral 67 16 98 % --   01/12/19 2030 146/67 97.5  F (36.4  C) Axillary 66 16 97 % 51.7 kg (113 lb 14.4 oz)   01/12/19 2000 101/63 -- -- -- -- 100 % --   01/12/19 1838 -- -- -- -- -- 94 % --   01/12/19 1837 -- -- -- -- -- 93 % --   01/12/19 1830 -- -- -- -- -- 98 % --   01/12/19 1815 -- -- -- -- -- 98 % --   01/12/19 1800 148/82 -- -- -- -- 98 % --   01/12/19 1753 148/82 -- -- 75 -- -- --   01/12/19 1717 -- -- -- -- -- 96 % --       Intake/Output Summary (Last 24 hours) at 1/13/2019 1707  Last data filed at  1/12/2019 2138  Gross per 24 hour   Intake 204 ml   Output --   Net 204 ml       Constitutional: Pleasant, alert, appropriate, following commands.  HEENT: Head atraumatic normocephalic. PERRLA.  Respiratory: Unlabored breathing no audible wheeze  Cardiovascular: Regular rate and rhythm  GI: Abdomen soft, non tender, and non distended.  Lymph/Hematologic: No edema or palor  Skin: No rashes, no cyanosis, no edema.  Neuro: Sensation intact to light touch in bilateral upper and lower extremities.  Musculoskeletal: Patient laying comfortably in bed   Left shoulder with moderate swelling and bruising  Left long arm splint in place   Sling in place  Sensation intact in upper arm over biceps as well as in hand r/m/u nerve distribution  Able to abduct and oppose left thumb  +cap refill      No ecchymosis or erythema over entirety of the left leg  No notable swelling or edema  Full ROM of left knee - 0 to 100 degrees  Full ROM of right knee - 0 to 100 degrees   Hip flexes to 100 degrees bilaterally   No pain with internal/external rotation while in flexion bilaterally   Bilateral calves are soft, non-tender.  Bilateral lower extremity is NVI.  Sensation intact bilateral lower extremities  5/5 motor with resisted dorsi and plantar flexion bilaterally  5/5 quad  5/5 EHL  +Dp pulse            Data:   All laboratory data reviewed  Results for orders placed or performed during the hospital encounter of 01/12/19   Elbow  XR, G/E 3 views, left    Narrative    ELBOW LEFT THREE OR MORE VIEWS   1/12/2019 5:56 PM     HISTORY: Fall, elbow pain.    COMPARISON: None.      Impression    IMPRESSION: Mild cortical irregularity along the radial head  laterally. This could just be degenerative change. It is difficult to  completely exclude a small fracture in this region. There are other  areas of degenerative osteophytes at the elbow.    KILO SIMPSON MD   XR Wrist Left G/E 3 Views    Narrative    WRIST LEFT THREE OR MORE VIEWS   1/12/2019 5:52  PM     HISTORY: Fall, wrist pain, deformity.    COMPARISON: None.      Impression    IMPRESSION: Acute impacted fracture of the distal left radius. Mild  posterior angulation of the distal fragment. Acute fracture of the  ulnar styloid.    KILO SIMPSON MD   XR Pelvis w Hip Left 1 View    Narrative    PELVIS AND HIP LEFT ONE VIEW   1/12/2019 5:48 PM     HISTORY: Fall, hip pain.    COMPARISON: None.      Impression    IMPRESSION: There is cortical irregularity along the superior left  pubic ramus. As such, cannot exclude fracture at this location. Both  hips appear located. Left proximal femoral surgical change appears  anatomic without evidence for hardware failure.    KILO SIMPSON MD   XR Shoulder Left 2 Views    Narrative    SHOULDER TWO VIEW LEFT   1/12/2019 5:50 PM     HISTORY: Fall, shoulder pain.    COMPARISON: None.      Impression    IMPRESSION: Acute fracture through the proximal humeral metadiaphysis  and humeral head. Impacted fracture fragments. No convincing  dislocation.    KILO SIMPSON MD   XR Wrist Left 2 Views    Narrative    WRIST LEFT TWO VIEW 1/12/2019 9:10 PM     HISTORY: Status post reduction.    COMPARISON: Earlier the same day.      Impression    IMPRESSION: New casting material over the left wrist limits  evaluation. The lateral view is nondiagnostic.    KATY QUEZADA MD   CBC with platelets + differential   Result Value Ref Range    WBC 14.7 (H) 4.0 - 11.0 10e9/L    RBC Count 3.69 (L) 3.8 - 5.2 10e12/L    Hemoglobin 10.5 (L) 11.7 - 15.7 g/dL    Hematocrit 32.0 (L) 35.0 - 47.0 %    MCV 87 78 - 100 fl    MCH 28.5 26.5 - 33.0 pg    MCHC 32.8 31.5 - 36.5 g/dL    RDW 14.5 10.0 - 15.0 %    Platelet Count 223 150 - 450 10e9/L    Diff Method Automated Method     % Neutrophils 86.7 %    % Lymphocytes 5.6 %    % Monocytes 6.3 %    % Eosinophils 0.3 %    % Basophils 0.1 %    % Immature Granulocytes 1.0 %    Nucleated RBCs 0 0 /100    Absolute Neutrophil 12.7 (H) 1.6 - 8.3 10e9/L    Absolute Lymphocytes  0.8 0.8 - 5.3 10e9/L    Absolute Monocytes 0.9 0.0 - 1.3 10e9/L    Absolute Eosinophils 0.1 0.0 - 0.7 10e9/L    Absolute Basophils 0.0 0.0 - 0.2 10e9/L    Abs Immature Granulocytes 0.1 0 - 0.4 10e9/L    Absolute Nucleated RBC 0.0    Basic metabolic panel   Result Value Ref Range    Sodium 140 133 - 144 mmol/L    Potassium 3.5 3.4 - 5.3 mmol/L    Chloride 104 94 - 109 mmol/L    Carbon Dioxide 27 20 - 32 mmol/L    Anion Gap 9 3 - 14 mmol/L    Glucose 138 (H) 70 - 99 mg/dL    Urea Nitrogen 18 7 - 30 mg/dL    Creatinine 0.66 0.52 - 1.04 mg/dL    GFR Estimate 82 >60 mL/min/[1.73_m2]    GFR Estimate If Black >90 >60 mL/min/[1.73_m2]    Calcium 8.2 (L) 8.5 - 10.1 mg/dL   CBC with platelets   Result Value Ref Range    WBC 7.0 4.0 - 11.0 10e9/L    RBC Count 2.96 (L) 3.8 - 5.2 10e12/L    Hemoglobin 8.5 (L) 11.7 - 15.7 g/dL    Hematocrit 25.3 (L) 35.0 - 47.0 %    MCV 86 78 - 100 fl    MCH 28.7 26.5 - 33.0 pg    MCHC 33.6 31.5 - 36.5 g/dL    RDW 14.7 10.0 - 15.0 %    Platelet Count 161 150 - 450 10e9/L   Basic metabolic panel   Result Value Ref Range    Sodium 141 133 - 144 mmol/L    Potassium 4.5 3.4 - 5.3 mmol/L    Chloride 108 94 - 109 mmol/L    Carbon Dioxide 25 20 - 32 mmol/L    Anion Gap 8 3 - 14 mmol/L    Glucose 131 (H) 70 - 99 mg/dL    Urea Nitrogen 26 7 - 30 mg/dL    Creatinine 0.85 0.52 - 1.04 mg/dL    GFR Estimate 64 >60 mL/min/[1.73_m2]    GFR Estimate If Black 74 >60 mL/min/[1.73_m2]    Calcium 7.7 (L) 8.5 - 10.1 mg/dL   Trauma Surgery IP Consult: Patient to be seen: Routine within 18 hours; Call back #: [phone]; fall multiple fractures; Consultant may enter orders: Yes    Narrative    Chano Lowry MD     1/13/2019 10:15 AM  General Surgery Consultation     Renetta Baker MRN# [id number]   YOB: 1935 Age: 83 year old   Date of Admission: 1/12/2019     Reason for consult: I was asked by Dr. Sosa to evaluate this   patient for fall with multiple orthopedic injuries.           Assessment and  Plan:   Active Problems:    Multiple fractures    Assessment: left wrist fracture - reduced/plinted  Proximal humerus fracture - on shoulder sling now ortho eval   pending  Concern of left pubic ramus fracture - ortho eval pending  No evidence of abdominal or thoracic injury      Plan: OK to feed after ortho evaluation  Bowel regimen, IS, DVT and PU prophylaxis in place               Chief Complaint:   Fall down 2-3 steps     History is obtained from the patient, electronic health record   and emergency department physician         History of Present Illness:   This patient is a 83 year old female who presents with falling   down a 2-3 steps, carpeted, leading to multiple locations on the   left upper extremity and maybe to the left pubic ramus.            Past Medical History:     Past Medical History:   Diagnosis Date     Dementia      Hypertension              Past Surgical History:   No past surgical history on file.             Social History:   I have reviewed this patient's social history          Family History:   I have reviewed this patient's family history          Immunizations:     There is no immunization history on file for this patient.          Allergies:   No Known Allergies          Medications:     Medications Prior to Admission   Medication Sig Dispense Refill Last Dose     ALENDRONATE SODIUM PO Take by mouth once a week   1/8/2019     citalopram (CELEXA) 10 MG tablet Take 10 mg by mouth every   evening    1/11/2019 at pm     lisinopril (PRINIVIL/ZESTRIL) 20 MG tablet Take 20 mg by mouth   every evening    1/11/2019 at pm     methimazole (TAPAZOLE) 5 MG tablet Take 5 mg by mouth every   morning (before breakfast)    1/12/2019 at am     oxybutynin (DITROPAN) 5 MG tablet Take 5 mg by mouth every   morning (before breakfast)    1/12/2019 at am             Review of Systems:   The 5 point Review of Systems is negative other than noted in the   HPI           Physical Exam:   Vitals were  reviewed  Constitutional:   awake, alert, cooperative, mild distress, appears stated age and   normal weight     Eyes:   sclera clear     Lungs:   no increased work of breathing, good air exchange, no   retractions     Abdomen:   no scars, soft, non-distended, non-tender, no masses palpated   and ascites absent     Musculoskeletal:   tone is normal     Skin:   normal skin color, texture, turgor             Data:   All laboratory and imaging data in the past 24 hours reviewed                Attestation:  I have reviewed today's vital signs, notes, medications, labs and imaging with Dr. Herrera.  Amount of time performed on this consult: 35 minutes.    Suzanne Valdes PA-C

## 2019-01-14 ENCOUNTER — APPOINTMENT (OUTPATIENT)
Dept: CT IMAGING | Facility: CLINIC | Age: 84
End: 2019-01-14
Payer: COMMERCIAL

## 2019-01-14 LAB — HGB BLD-MCNC: 7.6 G/DL (ref 11.7–15.7)

## 2019-01-14 PROCEDURE — 25000132 ZZH RX MED GY IP 250 OP 250 PS 637: Performed by: SURGERY

## 2019-01-14 PROCEDURE — G0378 HOSPITAL OBSERVATION PER HR: HCPCS

## 2019-01-14 PROCEDURE — 25000132 ZZH RX MED GY IP 250 OP 250 PS 637: Performed by: INTERNAL MEDICINE

## 2019-01-14 PROCEDURE — 36415 COLL VENOUS BLD VENIPUNCTURE: CPT | Performed by: PHYSICIAN ASSISTANT

## 2019-01-14 PROCEDURE — 72192 CT PELVIS W/O DYE: CPT

## 2019-01-14 PROCEDURE — 99226 ZZC SUBSEQUENT OBSERVATION CARE,LEVEL III: CPT | Performed by: PHYSICIAN ASSISTANT

## 2019-01-14 PROCEDURE — 85018 HEMOGLOBIN: CPT | Performed by: PHYSICIAN ASSISTANT

## 2019-01-14 PROCEDURE — 25000132 ZZH RX MED GY IP 250 OP 250 PS 637: Performed by: PHYSICIAN ASSISTANT

## 2019-01-14 PROCEDURE — 99231 SBSQ HOSP IP/OBS SF/LOW 25: CPT | Performed by: SURGERY

## 2019-01-14 RX ORDER — METHIMAZOLE 5 MG/1
5 TABLET ORAL
Status: DISCONTINUED | OUTPATIENT
Start: 2019-01-14 | End: 2019-01-24 | Stop reason: HOSPADM

## 2019-01-14 RX ORDER — OXYCODONE HYDROCHLORIDE 5 MG/1
5-10 TABLET ORAL EVERY 4 HOURS PRN
Status: DISCONTINUED | OUTPATIENT
Start: 2019-01-14 | End: 2019-01-15

## 2019-01-14 RX ORDER — OXYBUTYNIN CHLORIDE 5 MG/1
5 TABLET ORAL
Status: DISCONTINUED | OUTPATIENT
Start: 2019-01-15 | End: 2019-01-15

## 2019-01-14 RX ORDER — LISINOPRIL 20 MG/1
20 TABLET ORAL EVERY EVENING
Status: DISCONTINUED | OUTPATIENT
Start: 2019-01-14 | End: 2019-01-24 | Stop reason: HOSPADM

## 2019-01-14 RX ORDER — CITALOPRAM HYDROBROMIDE 10 MG/1
10 TABLET ORAL EVERY EVENING
Status: DISCONTINUED | OUTPATIENT
Start: 2019-01-14 | End: 2019-01-24 | Stop reason: HOSPADM

## 2019-01-14 RX ADMIN — CITALOPRAM HYDROBROMIDE 10 MG: 10 TABLET ORAL at 21:31

## 2019-01-14 RX ADMIN — ACETAMINOPHEN 1000 MG: 500 TABLET, FILM COATED ORAL at 14:57

## 2019-01-14 RX ADMIN — RANITIDINE 150 MG: 150 TABLET ORAL at 21:31

## 2019-01-14 RX ADMIN — LISINOPRIL 20 MG: 20 TABLET ORAL at 21:31

## 2019-01-14 RX ADMIN — METHIMAZOLE 5 MG: 5 TABLET ORAL at 16:57

## 2019-01-14 RX ADMIN — POLYETHYLENE GLYCOL 3350 17 G: 17 POWDER, FOR SOLUTION ORAL at 08:48

## 2019-01-14 RX ADMIN — ACETAMINOPHEN 1000 MG: 500 TABLET, FILM COATED ORAL at 21:29

## 2019-01-14 RX ADMIN — RANITIDINE 150 MG: 150 TABLET ORAL at 08:48

## 2019-01-14 RX ADMIN — ACETAMINOPHEN 1000 MG: 500 TABLET, FILM COATED ORAL at 06:16

## 2019-01-14 NOTE — PLAN OF CARE
Observation goals PRIOR TO DISCHARGE     Comments: -diagnostic tests and consults completed and resulted - partially met  -vital signs normal or at patient baseline - met  Nurse to notify provider when observation goals have been met and patient is ready for discharge.

## 2019-01-14 NOTE — PROGRESS NOTES
ADENIKE  I: ADENIKE emailed financial counselor to follow up regarding applying for Medical Assistance. ADENIKE awaits a response. Referrals have been sent for TCU in the case patient will qualify for MA. Patient may need to d.c home with home care if she doesn't qualify for MA.     ADDENDUM  I: ADENIKE was updated that Financial counselor will see patient today. ADENIKE will have to follow up in AM.    P: ADENIKE will continue to follow and assist as needed.    Ruma Madrigal, LEIDA   *[phone]

## 2019-01-14 NOTE — PLAN OF CARE
A&Ox4, VSS on RA with ex of slight htn 156/65. Farsi speaking, family interpreting. L arm and wrist in splint and sling. Requests female aids only. C/o pain managed with scheduled meds, declines further intervention. 1 ep of nausea this shift, declined intervention. Ax2-3 to bedside commode. Reg diet, voiding adequately, incontinent at times. IV SL. PT seen. Plan for SW consult to facilitate discharge planning to TCU. Will continue to monitor.

## 2019-01-14 NOTE — PLAN OF CARE
Patient is alert to self and situation, Tylenol was held because total received exceeds daily recommended dose, prn tramadol was given, patient's son at bed side. No weight bearing on the left extremities. Plan is to discharge to TCU.

## 2019-01-14 NOTE — CONSULTS
Care Transition Initial Assessment - ADENIKE     Met with: Patient and Family    Active Problems:    Multiple fractures       DATA  Lives With: child(ariel), adult   Living Arrangements: house  Quality of Family Relationships: helpful, involved     ASSESSMENT  Cognitive Status:  awake and alert    SW has reviewed pt records. Pt is Farsi speaking and family has waived , pt daughter present and interpreting. SW met with pt and daughter to discuss discharge recommendation for TCU. SW discussed that pt current insurance plan, [facility], does not have SNF benefits and therefore pt would be private pay for TCU. Pt and family inquired about cost for this; SW discussed that a deposit is required upfront which is typically $3000-$5000. Pt daughter overwhelmed by this cost and stated that they are unable to afford this. SW discussed the alternative of having home care ordered; discussed the services available and amount of visits to expect. Pt daughter stated that they will not be able to manage pt at home safely with her current level of functioning. Pt resides with her daughter and there are several stairs within the home and pt needs to be able to navigate them to access all necessary rooms in home. Pt required mod/max assist with 2 with therapy yesterday.     SW discussed contacted financial counselor to screen pt for MA, though informed pt daughter that based on their assessment pt may not qualify. Pt daughter verbalized understanding and is agreeable to meeting/being contacted by the financial counselor. ADENIKE sent out referrals to [place] [facility] via D[facility] ([facility]) to begin referral process.    ADENIKE paged financial counselors and requested that someone meet with/call pt daughter today. Pt daughter will be in pt room all day.     PLAN  Financial costs for the patient includes: Private pay TCU as has [facility] and is obs.  Patient given options and  choices for discharge: Yes.  Patient/family is agreeable to the plan?  Yes  Patient Goals and Preferences: TCU.  Patient anticipates discharging to:  TCU if pt MA pending.     [phone]

## 2019-01-14 NOTE — PROGRESS NOTES
Alert. Farsi speaking. Declined , son with patient who speaks english. L arm/wrist in splint and sling. Pain controlled with tylenol, regular diet, PT, requests female aids only, incontinent urine, continue to monitor

## 2019-01-14 NOTE — PROGRESS NOTES
Trauma Surgery Team Note    Stable S/P Fall with Left distal radius fracture, Left impacted proximal humerus fracture, and Left superior pubic rami fracture    -Continue PT/OT  -Transfer to TCU when cleared by Medicine/Ortho  -Follow up with Ortho  -Trauma will sign off.  Please call with any concerns.    Feeling a little more comfortable today but still hesitant to move due to pain.  Humerus pain is the worst.  Difficulty weight bearing, but does not describe as pain.  ?Weakness.  Happy surgery not needed.    NAD, pleasant, alert, frail  Daughter present  /65 (BP Location: Right arm)   Pulse 75   Temp 97.7  F (36.5  C) (Oral)   Resp 16   Wt 51.7 kg (113 lb 14.4 oz)   SpO2 96%   No intake or output data in the 24 hours ending 01/14/19 0858    Left UE: swollen and bruising, arm in sling and wrist/forearm splint.  NV intact distally.  LE: no swelling. NV intact. No change in sensation distally.  Rotates internally/externally ok.  Laying left side up (partial rt lateral decubitus)

## 2019-01-14 NOTE — PLAN OF CARE
Observation goals PRIOR TO DISCHARGE     Comments:   -diagnostic tests and consults completed and resulted - partially met, Ortho consult scheduled  -vital signs normal or at patient baseline - met  Nurse to notify provider when observation goals have been met and patient is ready for discharge.

## 2019-01-14 NOTE — PROGRESS NOTES
ED tech called regarding previous Xray not being able to be read prior but currently is able.  Tech questioning whether second xray order still needs to be done.  Per pt 's RN-no need to do second xray.

## 2019-01-14 NOTE — PROGRESS NOTES
"[facility]    Hospitalist Progress Note    Assessment & Plan   Renetta Baker is an 83 year old female with a history of dementia and HTN who presented after a mechanical fall sustaining a left distal radius fracture, left proximal humerus fracture and left pubic ramus fracture. She is admitted under observation status    Mechanical fall   Left distal radius fracture s/p reduction   Left proximal humerus fracture  Left pubic ramus fracture     Status post a mechanical fall resulting in a left wrist fracture needing reduction and left proximal humerus fracture and left pubic ramus fracture.  In the ED the wrist fracture was reduced and patient was placed in a splint.  - Orthopedics consulted, WBAT, follow-up with [facility] as outpatient, sling for 6 weeks  - Pain control with Tylenol 1000 mg TID  - Tramadol changed to PRN Oxycodone  - PT consulted, recommending TCU versus home with home care & 24h assist  - SW assisting with discharge planning  - Will obtain CT Pelvis to rule-out additional occult fracture given ongoing discomfort and inability to bear weight    Anemia  Hgb 10.5--8.5. In setting of receiving ongoing IVF, possible component of dilution. Baseline unclear. Pt is not on blood thinner, however with underlying pubic rami fx there is possibility of having a traumatic hematoma. Hemodynamics stable.  - Repeat Hgb this AM 7.6  - Monitor hemodynamics     Hypertension  Blood pressures initially soft, now with SBPs 150s-160s  - Resume PTA lisinopril     History of depression  - Resume PTA Celexa     Hyperthyroidism   - Resume PTA Methimazole     History of urinary incontinence  - Resume PTA Oxybutynin      DVT Prophylaxis: Pneumatic Compression Devices  Code Status: Full Code    Disposition: Expected discharge tomorrow pending safe discharge.    Francisco Zaman PA-C    Interval History   Pt seen & evaluated with daughter at bedside. Daughter upset that mother cannot be out of bed or stand for \"more " "than one second\" without experiencing severe pain. Has been seen by PT and recommended TCU, unfortunately insurance does not cover - daughter stating she cannot afford TCU and also cannot take her home at this time. Appears to be confused with lack of resources, reports that friends who also have same insurance carrier have TCU coverage. Asking about 24h assistance at home. Long discussion regarding observation status and need to anticipate discharge in the upcoming 24h.     -Data reviewed today: I reviewed all new labs and imaging results over the last 24 hours. I personally reviewed no images or EKG's today.    Physical Exam   Temp: 97.7  F (36.5  C) Temp src: Oral BP: 156/65 Pulse: 75   Resp: 18 SpO2: 96 % O2 Device: None (Room air)    Vitals:    01/12/19 2030   Weight: 51.7 kg (113 lb 14.4 oz)     Vital Signs with Ranges  Temp:  [96  F (35.6  C)-97.9  F (36.6  C)] 97.7  F (36.5  C)  Pulse:  [67-75] 75  Resp:  [16-18] 18  BP: (130-156)/(60-65) 156/65  SpO2:  [96 %-97 %] 96 %  I/O last 3 completed shifts:  In: 90 [P.O.:90]  Out: -     GEN: well-developed, well-nourished, appears comfortable  PULM: lungs CTA bilaterally, no increased work of breathing, no wheeze, crackles, rhonchi  CV: RRR, S1 & S2, no murmur  GI: soft, nontender, nondistended, +BS in all 4 quadrants  SKIN: warm & dry without rash, no pedal edema; R shoulder edematous with circumferential ecchymosis, right forearm with splint in place, digits warm, able to wiggle, no ecchymosis to groin     Medications     sodium chloride Stopped (01/13/19 1837)       acetaminophen  1,000 mg Oral Q8H     polyethylene glycol  17 g Oral Daily     ranitidine  150 mg Oral BID       Data   Recent Labs   Lab 01/14/19  1032 01/13/19  0638 01/12/19  1800   WBC  --  7.0 14.7*   HGB 7.6* 8.5* 10.5*   MCV  --  86 87   PLT  --  161 223   NA  --  141 140   POTASSIUM  --  4.5 3.5   CHLORIDE  --  108 104   CO2  --  25 27   BUN  --  26 18   CR  --  0.85 0.66   ANIONGAP  --  8 9 "   VICENTE  --  7.7* 8.2*   GLC  --  131* 138*       No results found for this or any previous visit (from the past 24 hour(s)).

## 2019-01-14 NOTE — PLAN OF CARE
2333-1963: Disoriented to time, place, speaks Farsi, family in room translating for pt. VSS on RA. Saline locked. Pain increases w/ movement, declined ice pack or pain medication. CMS intact, LUE in sling, ace wrapped, fingers warm, bruising and swelling on shoulder, denies n/t, bilat pedal pulses +2, LLE no discoloration, denies n/t. Denies SOB, nausea, dizziness. Regular diet. PCDs on. PT, Ortho consults complete, discharge pending TCU placement. Continue to monitor.

## 2019-01-14 NOTE — PROVIDER NOTIFICATION
MD Notification    Notified Person: MD    Notified Person Name:    Notification Date/Time: 1/13/2019/ 06:30 PM    Notification Interaction: Phone    Purpose of Notification: XR tech asked if we need to repeat the wrist XR-lateral as the corrupted results were able to be read.    Orders Received: No repeat of the lateral XR    Comments:

## 2019-01-15 LAB — HGB BLD-MCNC: 7.6 G/DL (ref 11.7–15.7)

## 2019-01-15 PROCEDURE — 99207 ZZC CDG-MDM COMPONENT: MEETS LOW - DOWN CODED: CPT | Performed by: PHYSICIAN ASSISTANT

## 2019-01-15 PROCEDURE — 25000132 ZZH RX MED GY IP 250 OP 250 PS 637: Performed by: PHYSICIAN ASSISTANT

## 2019-01-15 PROCEDURE — G0378 HOSPITAL OBSERVATION PER HR: HCPCS

## 2019-01-15 PROCEDURE — 99232 SBSQ HOSP IP/OBS MODERATE 35: CPT | Performed by: PHYSICIAN ASSISTANT

## 2019-01-15 PROCEDURE — 12000000 ZZH R&B MED SURG/OB

## 2019-01-15 PROCEDURE — 36415 COLL VENOUS BLD VENIPUNCTURE: CPT | Performed by: PHYSICIAN ASSISTANT

## 2019-01-15 PROCEDURE — 85018 HEMOGLOBIN: CPT | Performed by: PHYSICIAN ASSISTANT

## 2019-01-15 PROCEDURE — 25000132 ZZH RX MED GY IP 250 OP 250 PS 637: Performed by: SURGERY

## 2019-01-15 PROCEDURE — 25000132 ZZH RX MED GY IP 250 OP 250 PS 637: Performed by: INTERNAL MEDICINE

## 2019-01-15 RX ORDER — OXYCODONE HYDROCHLORIDE 5 MG/1
5 TABLET ORAL EVERY 4 HOURS PRN
Status: DISCONTINUED | OUTPATIENT
Start: 2019-01-15 | End: 2019-01-15

## 2019-01-15 RX ADMIN — OXYBUTYNIN CHLORIDE 5 MG: 5 TABLET ORAL at 09:45

## 2019-01-15 RX ADMIN — LISINOPRIL 20 MG: 20 TABLET ORAL at 20:27

## 2019-01-15 RX ADMIN — ACETAMINOPHEN 1000 MG: 500 TABLET, FILM COATED ORAL at 06:04

## 2019-01-15 RX ADMIN — RANITIDINE 150 MG: 150 TABLET ORAL at 20:27

## 2019-01-15 RX ADMIN — ACETAMINOPHEN 1000 MG: 500 TABLET, FILM COATED ORAL at 13:46

## 2019-01-15 RX ADMIN — RANITIDINE 150 MG: 150 TABLET ORAL at 09:45

## 2019-01-15 RX ADMIN — Medication 2.5 MG: at 11:54

## 2019-01-15 RX ADMIN — CITALOPRAM HYDROBROMIDE 10 MG: 10 TABLET ORAL at 20:27

## 2019-01-15 RX ADMIN — ACETAMINOPHEN 1000 MG: 500 TABLET, FILM COATED ORAL at 21:33

## 2019-01-15 RX ADMIN — METHIMAZOLE 5 MG: 5 TABLET ORAL at 09:45

## 2019-01-15 ASSESSMENT — ACTIVITIES OF DAILY LIVING (ADL)
ADLS_ACUITY_SCORE: 22
ADLS_ACUITY_SCORE: 26
ADLS_ACUITY_SCORE: 24

## 2019-01-15 NOTE — PLAN OF CARE
Observation goals PRIOR TO DISCHARGE     Comments: -diagnostic tests and consults completed and resulted - not met, CT of pelvis.    -vital signs normal or at patient baseline - met  Nurse to notify provider when observation goals have been met and patient is ready for discharge.       [id number]. VSS on RA. Farsi speaking, family interpreting. L arm and wrist in splint and sling, elevated and iced, bruised and swollen. Requests female aids only. C/o pain managed with scheduled tylenol, declines further intervention. Strong Ax2-3 to bedside commode. WBAT to LLE. Non-weight bearing to LUE. Reg diet, voiding adequately, incontinent at times. IV SL. PT seen. Plan for SW consult to facilitate discharge planning to TCU. Will continue to monitor. Plan for CT of pelvis bone wo & w contrast.

## 2019-01-15 NOTE — PLAN OF CARE
Pt transferred from Obs at 1400. A&OX3, hx of dementia. Farsi speaking. Family at beside interpreting. VSS. Pt incontinence of urine- pure wick in place. Sling in place -LUE. Continue to monitor.

## 2019-01-15 NOTE — PLAN OF CARE
Pt. Transferred to room 526 report given to Yolette RN, daughter at bedside, belongings sent with pt. And daughter.     VSS on RA. Farsi speaking, family interpreting. Alert to self, place and forgetful. L arm and wrist in splint and sling, elevated and iced, bruised and swollen. Requests female aids only. C/o pain managed with scheduled tylenol, and given oxycodone x1 with relief. Up with lift Ax2. Incontinent of urine at times. WBAT to LLE. NWB to LUE. Reg diet, poor appetitive. IV SL. PT and ortho following. SW following to facilitate discharge planning to TCU. Will continue to monitor.

## 2019-01-15 NOTE — PROGRESS NOTES
Orthopedic Surgery  Renetta Baker  1/15/2019  Admit Date:  2019  Multiple fractures - non-operative  Left superior pubic rami fracture  Sacral ala fractures (bilateral)  Right iliac wing fracture?  Left proximal humerus fracture  Left distal radius fracture    Patient resting comfortably in bed.    Pain controlled.  Tolerating oral intake.    Denies nausea or vomiting  Denies chest pain or shortness of breath  No events overnight.     Alert and orient to person, place, and time.  Vital Sign Ranges  Temperature Temp  Av.9  F (36.6  C)  Min: 96.9  F (36.1  C)  Max: 98.6  F (37  C)   Blood pressure Systolic (24hrs), Av , Min:144 , Max:167        Diastolic (24hrs), Av, Min:59, Max:77      Pulse Pulse  Av  Min: 74  Max: 81   Respirations Resp  Av.3  Min: 16  Max: 18   Pulse oximetry SpO2  Av.3 %  Min: 95 %  Max: 99 %       Leg lengths equal  Negative log roll bilaterally  Hip flexes to 75 degrees without pain  Minimal erythema of the surrounding skin.   Bilateral calves are soft, non-tender.  Bilateral lower extremity is NVI.  Sensation intact bilateral lower extremities  Active dorsi and plantar flexion bilaterally  5/5 EHL  +Dp pulses    Sling in place  Left arm marked swollen  Spotty ecchymosis over entirety of arm  Comparments compressible  Sensation intact in upper arm over biceps as well as in hand r/m/u nerve distribution  Able to abduct and oppose left thumb  Fingers warm and well perfused  +Radial pulse  +cap refill       Labs:  Recent Labs   Lab Test 19  0638 19  1800   POTASSIUM 4.5 3.5     Recent Labs   Lab Test 01/15/19  0819 19  1032 19  0638   HGB 7.6* 7.6* 8.5*     No results for input(s): INR in the last 06217 hours.  Recent Labs   Lab Test 19  0638 19  1800    223       A/P  1. Plan   Continue SCDs for DVT prophylaxis.  Consider ASA once hgb stable   Mobilize with PT/OT - likely only able to do bed ex (SLR) and transfers  to chair/commode   Spirometry   May WBAT on LE with walker, likely will not tolerate more than pivot transfers to chair.     Non-Wb left UE   Continue splint left UE   q shift check of sensation and ROM   Continue current pain regiment.   Will continue to follow    2. Disposition   Anticipate d/c based on hgb and swelling of arm/medical clearance    Alla Herron PA-C

## 2019-01-15 NOTE — PROGRESS NOTES
Observation goals PRIOR TO DISCHARGE     Comments: -diagnostic tests and consults completed and resulted - partially met, ortho reconsulted  -vital signs normal or at patient baseline - paritally met  Nurse to notify provider when observation goals have been met and patient is ready for discharge.     Pt. Transitioning into Inpatient.

## 2019-01-15 NOTE — PROGRESS NOTES
[facility]    Hospitalist Progress Note      Assessment & Plan   Renetta Baker is a 83 year old female with a past medical history significant for dementia, hypertension, hyperthyroidism, and depression who was admitted on 1/12/2019 after a fall sustaining multiple fractures.     Mechanical fall  Left distal radius fracture s/p reduction  Left proximal humerus fracture  Left superior and inferior pubic rami fractures  Bilateral sacral fractures   Patient suffered unwitnessed fall down two carpeted stairs and sustained injuries to her left arm and pelvis. She was found by family and EMS called. Unable to obtain further details due to patient's dementia. Xrays in the ED showed distal radius fracture, proximal humerus fracture, and pubic rami fracture. Her wrist fracture was reduce and splint placed and admission requested. Because of her inability to bear weight CT pelvis was obtained 1/14 showing minimally displaced left superior pubic ramus fracture, inferior pubic ramus fracture, and bilateral sacral fractures all acute appearing. Pain control has been difficult as patient had significant nausea with fentanyl as well as agitation with Tramadol so she and family are hesitant to use narcotics. She has been unable to get out of bed.   --Orthopedics following, will need to closely monitor LUE for any evidence of compartment syndrome.   --WBAT lower extremities, NWB LUE  --continue scheduled tylenol  --add low dose oxycodone 2.5-5mg q4 hours prn  --will need TCU, SW following  --PT    Anemia. Per PCP records baseline hgb seems to be around 11-12. Hgb on admission 10.5, down to 7.6 on 1/14 and stable. Unclear source though suspect she may have some bleeding related to arm and pelvic fractures. No mention of hematoma in pelvic CT read. Denies dizziness today. She is not anticoagulated. No blood in stool 1/14 per family.   --continue to monitor in the hospital   --if worsening will consider further  imaging to eval for retroperitoneal bleed  --arm exam q shift     Hypertension. Continue prior to admission lisinopril    Hyperthyroidism. Continue prior to admission methimazole    Depression. Continue prior to admission Celexa    History of urinary incontinence. Hold prior to admission Oxybutynin with concurrent use of opioids to help prevent delirium    DVT Prophylaxis: Pneumatic Compression Devices  Code Status: Full Code    Disposition: Expected discharge in 2+ days pending stable hgb, improved pain control and mobility and safe discharge plan    This patient was seen and examined with Dr. Painting who agrees with the above plan.    Damaris Gary PA-C    Interval History   History obtained through family who serves as . Patient reports she continue to have significant pain in pelvis with any movement. Left arm also sore. Tylenol is helping. Denies nausea and tolerated small breakfast. Last BM 1/14. Denies numbness or tingling in extremities. She has been unable to get out of bed today even with assistance from several staff members.     -Data reviewed today: I reviewed all new labs and imaging results over the last 24 hours. I personally reviewed no images or EKG's today.    Physical Exam   Temp: 97.7  F (36.5  C) Temp src: Oral BP: 144/68 Pulse: 74   Resp: 16 SpO2: 98 % O2 Device: None (Room air)    Vitals:    01/12/19 2030   Weight: 51.7 kg (113 lb 14.4 oz)     Vital Signs with Ranges  Temp:  [96.9  F (36.1  C)-98.6  F (37  C)] 97.7  F (36.5  C)  Pulse:  [74-81] 74  Resp:  [16-18] 16  BP: (144-167)/(59-77) 144/68  SpO2:  [95 %-99 %] 98 %  I/O last 3 completed shifts:  In: 90 [P.O.:90]  Out: -     Constitutional: Appears comfortable, laying down in bed. Communicates through family.   ENT: normal cephalic, moist mucous membranes  Respiratory: Lungs clear to auscultation bilaterally, no increased work of breathing or wheezing  Cardiovascular: Regular rate and rhythm, no murmur, no LE  edema  GI:positive bowel sounds, abdomen soft, non-tender  Skin/Integumen: warm, dry. Left upper extremity with significant swelling with a few mild areas of ecchymosis.    MSK:  Calves are non-tender. Intact dorsi and plantar flexion bilaterally. Hip flexion intact. Splint and sling in place LUE. Fingers of LUE are warm, patient can wiggle.   Neuro:  Sensation in extremities intact. Face symmetric. No focal deficits.     Medications     sodium chloride Stopped (01/13/19 9787)       acetaminophen  1,000 mg Oral Q8H     citalopram  10 mg Oral QPM     lisinopril  20 mg Oral QPM     methimazole  5 mg Oral QAM AC     oxybutynin  5 mg Oral QAM AC     polyethylene glycol  17 g Oral Daily     ranitidine  150 mg Oral BID     sodium chloride (PF)  3 mL Intracatheter Q8H       Data   Recent Labs   Lab 01/15/19  0819 01/14/19  1032 01/13/19  0638 01/12/19  1800   WBC  --   --  7.0 14.7*   HGB 7.6* 7.6* 8.5* 10.5*   MCV  --   --  86 87   PLT  --   --  161 223   NA  --   --  141 140   POTASSIUM  --   --  4.5 3.5   CHLORIDE  --   --  108 104   CO2  --   --  25 27   BUN  --   --  26 18   CR  --   --  0.85 0.66   ANIONGAP  --   --  8 9   VICENTE  --   --  7.7* 8.2*   GLC  --   --  131* 138*       Recent Results (from the past 24 hour(s))   CT Pelvis Bone wo Contrast    Narrative    CT PELVIS BONE WITHOUT CONTRAST  1/14/2019 9:21 PM     HISTORY:  Abdomen-pelvis trauma, minor, blunt. Pubic rami fracture on  plain films, unable to bear weight.  Rule out additional occult  fracture.    Radiation dose for this scan was reduced using automated exposure  control, adjustment of the mA and/or kV according to patient size, or  iterative reconstruction technique.    FINDINGS: There is a comminuted minimally displaced left superior  pubic ramus fracture. Also noted is a nondisplaced fracture involving  the medial aspect of the left inferior pubic ramus. Subtle cortical  disruption is noted in the inferolateral aspect of both sacral  ala,  indicating bilateral sacral alar nondisplaced fractures. Advanced  apophyseal joint degenerative arthrosis is noted in the lower lumbar  spine. Dynamic compression screw and sideplate are noted in the  proximal aspect of the left femur without evidence of acute left  femoral fracture. The right proximal femur also appears intact. Hip  joint space width appears grossly normal.      Impression    IMPRESSION: Left superior/inferior pubic rami acute-appearing  fractures. Bilateral sacral alar acute-appearing fractures.    PARI LI MD

## 2019-01-15 NOTE — PLAN OF CARE
Observation goals PRIOR TO DISCHARGE     Comments: -diagnostic tests and consults completed and resulted - met  -vital signs normal or at patient baseline - met  Nurse to notify provider when observation goals have been met and patient is ready for discharge.

## 2019-01-15 NOTE — PLAN OF CARE
Comments: -diagnostic tests and consults completed and resulted - met, CT of pelvis.    -vital signs normal or at patient baseline - met  Nurse to notify provider when observation goals have been met and patient is ready for discharge.         1900 to 2300 : VSS on RA. Farsi speaking, family interpreting. L arm and wrist in splint and sling, elevated and iced, bruised and swollen. Requests female aids only. C/o pain managed with scheduled tylenol, declines further intervention. Strong Ax2-3 to bedside commode. WBAT to LLE. Non-weight bearing to LUE. Reg diet, voiding adequately, incontinent at times. IV SL. PT seen. Plan for SW consult to facilitate discharge planning to TCU. Will continue to monitor. Plan for CT of pelvis bone wo & w contrast was done waiting results.

## 2019-01-15 NOTE — PLAN OF CARE
Observation goals PRIOR TO DISCHARGE     Comments: -diagnostic tests and consults completed and resulted - met  -vital signs normal or at patient baseline - met  Nurse to notify provider when observation goals have been met and patient is ready for discharge.          BP is stable

## 2019-01-16 ENCOUNTER — APPOINTMENT (OUTPATIENT)
Dept: PHYSICAL THERAPY | Facility: CLINIC | Age: 84
End: 2019-01-16
Payer: COMMERCIAL

## 2019-01-16 LAB — HGB BLD-MCNC: 7.7 G/DL (ref 11.7–15.7)

## 2019-01-16 PROCEDURE — 25000132 ZZH RX MED GY IP 250 OP 250 PS 637: Performed by: PHYSICIAN ASSISTANT

## 2019-01-16 PROCEDURE — 25000132 ZZH RX MED GY IP 250 OP 250 PS 637: Performed by: INTERNAL MEDICINE

## 2019-01-16 PROCEDURE — 40000193 ZZH STATISTIC PT WARD VISIT: Performed by: PHYSICAL THERAPIST

## 2019-01-16 PROCEDURE — 36415 COLL VENOUS BLD VENIPUNCTURE: CPT | Performed by: PHYSICIAN ASSISTANT

## 2019-01-16 PROCEDURE — 99207 ZZC CDG-MDM COMPONENT: MEETS LOW - DOWN CODED: CPT | Performed by: PHYSICIAN ASSISTANT

## 2019-01-16 PROCEDURE — 12000000 ZZH R&B MED SURG/OB

## 2019-01-16 PROCEDURE — 97162 PT EVAL MOD COMPLEX 30 MIN: CPT | Mod: GP | Performed by: PHYSICAL THERAPIST

## 2019-01-16 PROCEDURE — 85018 HEMOGLOBIN: CPT | Performed by: PHYSICIAN ASSISTANT

## 2019-01-16 PROCEDURE — 97530 THERAPEUTIC ACTIVITIES: CPT | Mod: GP | Performed by: PHYSICAL THERAPIST

## 2019-01-16 PROCEDURE — 99232 SBSQ HOSP IP/OBS MODERATE 35: CPT | Performed by: PHYSICIAN ASSISTANT

## 2019-01-16 PROCEDURE — 25000132 ZZH RX MED GY IP 250 OP 250 PS 637: Performed by: SURGERY

## 2019-01-16 PROCEDURE — 97110 THERAPEUTIC EXERCISES: CPT | Mod: GP | Performed by: PHYSICAL THERAPIST

## 2019-01-16 RX ADMIN — RANITIDINE 150 MG: 150 TABLET ORAL at 21:08

## 2019-01-16 RX ADMIN — Medication 2.5 MG: at 13:48

## 2019-01-16 RX ADMIN — ACETAMINOPHEN 1000 MG: 500 TABLET, FILM COATED ORAL at 08:10

## 2019-01-16 RX ADMIN — LISINOPRIL 20 MG: 20 TABLET ORAL at 21:08

## 2019-01-16 RX ADMIN — RANITIDINE 150 MG: 150 TABLET ORAL at 08:10

## 2019-01-16 RX ADMIN — CITALOPRAM HYDROBROMIDE 10 MG: 10 TABLET ORAL at 21:08

## 2019-01-16 RX ADMIN — METHIMAZOLE 5 MG: 5 TABLET ORAL at 08:10

## 2019-01-16 RX ADMIN — Medication 1 MG: at 21:08

## 2019-01-16 RX ADMIN — ACETAMINOPHEN 1000 MG: 500 TABLET, FILM COATED ORAL at 13:46

## 2019-01-16 RX ADMIN — METHIMAZOLE 5 MG: 5 TABLET ORAL at 09:53

## 2019-01-16 RX ADMIN — ACETAMINOPHEN 1000 MG: 500 TABLET, FILM COATED ORAL at 21:08

## 2019-01-16 RX ADMIN — POLYETHYLENE GLYCOL 3350 17 G: 17 POWDER, FOR SOLUTION ORAL at 09:53

## 2019-01-16 ASSESSMENT — ACTIVITIES OF DAILY LIVING (ADL)
ADLS_ACUITY_SCORE: 23
ADLS_ACUITY_SCORE: 23
ADLS_ACUITY_SCORE: 19
ADLS_ACUITY_SCORE: 19
ADLS_ACUITY_SCORE: 22
ADLS_ACUITY_SCORE: 23

## 2019-01-16 NOTE — PROGRESS NOTES
[facility] Orthopedic Progress Note  Renetta Baker is a 83 year old female    Today's Date:1/16/2019  Admission Date: 1/12/2019      Pelvic fractures  Left proximal humerus fracture  Left distal radius fracture.    Patient Seen.  Doing well, considering.  Dressings are clean, dry, and intact.  Circulation, motor and sensory function, intact distally.  CMS intact in LUE.        PLAN:  ASA for DVT prophylaxis when medically stable.  325mg daily.  Continue SCDs for DVT prophylaxis.                Mobilize with PT/OT - likely only able to do bed ex (SLR) and transfers to chair/commode              Spirometry              May WBAT on LE with walker, likely will not tolerate more than pivot transfers to chair.                Non-Wb left UE              Continue splint left UE              q shift check of sensation and ROM              Continue current pain regiment.              Will continue to follow        Temp:  [98.1  F (36.7  C)-100.4  F (38  C)] 99.4  F (37.4  C)  Pulse:  [72-77] 77  Resp:  [16] 16  BP: (137-149)/(67-78) 149/77  SpO2:  [96 %-98 %] 97 %      Results for orders placed or performed during the hospital encounter of 01/12/19 (from the past 24 hour(s))   Hemoglobin   Result Value Ref Range    Hemoglobin 7.7 (L) 11.7 - 15.7 g/dL         Alvaro Chao

## 2019-01-16 NOTE — PROGRESS NOTES
[facility]    Hospitalist Progress Note      Assessment & Plan   Renetta Baker is a 83 year old female with a past medical history significant for dementia, hypertension, hyperthyroidism, and depression who was admitted on 1/12/2019 after a fall sustaining multiple fractures.      Mechanical fall  Left distal radius fracture s/p reduction  Left proximal humerus fracture  Left superior and inferior pubic rami fractures  Bilateral sacral fractures   Patient suffered unwitnessed fall down two carpeted stairs and sustained injuries to her left arm and pelvis. She was found by family and EMS called. Unable to obtain further details due to patient's dementia. Xrays in the ED showed distal radius fracture, proximal humerus fracture, and pubic rami fracture. Her wrist fracture was reduce and splint placed and admission requested. Because of her inability to bear weight CT pelvis was obtained 1/14 showing minimally displaced left superior pubic ramus fracture, inferior pubic ramus fracture, and bilateral sacral fractures all acute appearing. Pain control has been difficult as patient had significant nausea with fentanyl as well as agitation with Tramadol so she and family are hesitant to use narcotics. Worsening arm pain today.   --Orthopedics following, will need to closely monitor LUE for any evidence of compartment syndrome.   --WBAT lower extremities, NWB LUE  --continue scheduled tylenol  -- low dose oxycodone 2.5-5mg q4 hours prn  --will need TCU, SW following  --PT  -- if CBC stable 1/17 will start full dose aspirin daily for DVT prophylaxis     Anemia. Per PCP records baseline hgb seems to be around 11-12. Hgb on admission 10.5, down to 7.6 on 1/14 and stable. Unclear source though suspect she may have some bleeding related to arm and pelvic fractures. No mention of hematoma in pelvic CT read. Denies dizziness today. She is not anticoagulated. No blood in stool 1/14 per family.   --continue to  monitor in the hospital   --if worsening will consider further imaging to eval for retroperitoneal bleed    Fever. Fever 100.4 morning of 1/16. Denies urinary symptoms, cough, shortness of breath. ?atelectasis from immobility.   --encourage IS  --if further fever will obtain CXR, UA, BC     Hypertension. Continue prior to admission lisinopril     Hyperthyroidism. Continue prior to admission methimazole     Depression. Continue prior to admission Celexa     History of urinary incontinence. Hold prior to admission Oxybutynin with concurrent use of opioids to help prevent delirium    DVT Prophylaxis: PCD, start asa tomorrow if CBC stable  Code Status: Full Code    Disposition: Expected discharge in 1-2 days, monitor for fever. Work on dispo    This patient was seen and examined with Dr. Denson who agrees with the above plan.    Damaris Gary PA-C    Interval History   Slept well. Reports increased pain in left arm today. Denies numbness, occasionally mild tingling in fingers. Pelvic pain worse with any movement. Son present at bedside, isn't sure how Oxycodone was tolerated yesterday. Patient denies nausea, ate small breakfast. Denies cough or shortness of breath. Denies dysuria.     -Data reviewed today: I reviewed all new labs and imaging results over the last 24 hours. I personally reviewed no images or EKG's today.    Physical Exam   Temp: 98.1  F (36.7  C) Temp src: Oral BP: 141/77 Pulse: 72   Resp: 16 SpO2: 97 % O2 Device: None (Room air)    Vitals:    01/12/19 2030 01/16/19 0653   Weight: 51.7 kg (113 lb 14.4 oz) 54.8 kg (120 lb 13 oz)     Vital Signs with Ranges  Temp:  [97.7  F (36.5  C)-100.4  F (38  C)] 98.1  F (36.7  C)  Pulse:  [72-77] 72  Resp:  [16] 16  BP: (137-159)/(67-78) 141/77  SpO2:  [96 %-98 %] 97 %  I/O last 3 completed shifts:  In: 250 [P.O.:240; I.V.:10]  Out: 100 [Urine:100]    Constitutional: Appears comfortable, laying down in bed. Communicates through family.   ENT: normal cephalic,  moist mucous membranes  Respiratory: Lungs clear to auscultation bilaterally, no increased work of breathing or wheezing  Cardiovascular: Regular rate and rhythm, no murmur, no LE edema  GI:positive bowel sounds, abdomen soft, non-tender  Skin/Integumen: warm, dry. Left upper extremity with significant swelling with posterior ecchymosis.    MSK:  Calves are non-tender. Intact dorsi and plantar flexion bilaterally. Hip flexion intact. Splint and sling in place LUE. Fingers of LUE are warm, patient can wiggle.   Neuro:  Sensation in extremities intact. Face symmetric. No focal deficits.       Medications       acetaminophen  1,000 mg Oral Q8H     citalopram  10 mg Oral QPM     lisinopril  20 mg Oral QPM     methimazole  5 mg Oral QAM AC     polyethylene glycol  17 g Oral Daily     ranitidine  150 mg Oral BID     sodium chloride (PF)  3 mL Intracatheter Q8H       Data   Recent Labs   Lab 01/16/19  0717 01/15/19  0819 01/14/19  1032 01/13/19  0638 01/12/19  1800   WBC  --   --   --  7.0 14.7*   HGB 7.7* 7.6* 7.6* 8.5* 10.5*   MCV  --   --   --  86 87   PLT  --   --   --  161 223   NA  --   --   --  141 140   POTASSIUM  --   --   --  4.5 3.5   CHLORIDE  --   --   --  108 104   CO2  --   --   --  25 27   BUN  --   --   --  26 18   CR  --   --   --  0.85 0.66   ANIONGAP  --   --   --  8 9   VICENTE  --   --   --  7.7* 8.2*   GLC  --   --   --  131* 138*       No results found for this or any previous visit (from the past 24 hour(s)).

## 2019-01-16 NOTE — PLAN OF CARE
Discharge Planner PT   Patient plan for discharge: TCU  Current status: Pt was evaluated on 1/13 while on OBS and was deferred to recommended TCU. Pt has not been seen by PT until today. Pt is inpatient and has been having too much pain for mobility. Pt and family member were educated on importance of mobility even with limitations. Pt was cooperative and agreeable and family member interpreted session. Pt sat EOB with maxA of 2 using HOB elevated to assist. Pt NWB left UE. Pt sat with feet supported on bedside with SBA. Pt had increased pain in UE more than pelvis. Pt was able to completed some LE AROM. Pt stood EOB with max/modA of one. Pt had increased pelvic pain standing and was unable to weight shift for step. Pt was assisted back to sitting and supine with maxA of 2. Pt tolerated well with most pain in humerus. Pt was positioned with pillows for heel relief and UE support and was given cold pack for shoulder pain.  Barriers to return to prior living situation: Stairs, decreased mobility to be managed by family  Recommendations for discharge: TCU  Rationale for recommendations: Pt has multiple fractures causing pain and mobility limitations due to NWB left UE and pain with weight bearing as tolerated in LE. Pt needs increased assist for all mobility and skilled care to improve mobility.       Entered by: Alem Gomez 01/16/2019 10:20 AM

## 2019-01-16 NOTE — PLAN OF CARE
Vitals stable. CMS intact.Seems comfortable. Left arm sling intact. Taking tylenol for pain. Family at bedside. Stable-slow progress.

## 2019-01-16 NOTE — PLAN OF CARE
A&O x4, non-english speaker- daughter/son translates and pt denies - waver signed, VSS on RA, CMS intact, Drng CDI with sling in place on LUE, Activity bedrest- refuses repositioning sometimes- pt states she moves on her own in bed to promote circulation on back, incontinent- purewick in place, Pain controlled w/ tylenol- refused oxycodone this shift, Diet reg, IV-SL. Continue to monitor.

## 2019-01-16 NOTE — PLAN OF CARE
A&O x4, non-english speaker- daughter translates and pt denies - waver signed, VSS on RA, CMS intact, Drng CDI with sling in place on LUE, Activity bedrest- refuses repositioning sometimes- pt states she moves on her own in bed to promote circulation on back, incontinent- purewick in place and changed this shift, Pain controlled w/ tylenol- refused oxycodone this shift, Diet reg, IV-SL. Continue to monitor.

## 2019-01-17 ENCOUNTER — APPOINTMENT (OUTPATIENT)
Dept: PHYSICAL THERAPY | Facility: CLINIC | Age: 84
End: 2019-01-17
Payer: COMMERCIAL

## 2019-01-17 LAB
CREAT SERPL-MCNC: 0.58 MG/DL (ref 0.52–1.04)
ERYTHROCYTE [DISTWIDTH] IN BLOOD BY AUTOMATED COUNT: 14.5 % (ref 10–15)
GFR SERPL CREATININE-BSD FRML MDRD: 86 ML/MIN/{1.73_M2}
HCT VFR BLD AUTO: 23.1 % (ref 35–47)
HGB BLD-MCNC: 7.7 G/DL (ref 11.7–15.7)
MCH RBC QN AUTO: 28.6 PG (ref 26.5–33)
MCHC RBC AUTO-ENTMCNC: 33.3 G/DL (ref 31.5–36.5)
MCV RBC AUTO: 86 FL (ref 78–100)
PLATELET # BLD AUTO: 234 10E9/L (ref 150–450)
RBC # BLD AUTO: 2.69 10E12/L (ref 3.8–5.2)
WBC # BLD AUTO: 6.5 10E9/L (ref 4–11)

## 2019-01-17 PROCEDURE — 12000000 ZZH R&B MED SURG/OB

## 2019-01-17 PROCEDURE — 40000193 ZZH STATISTIC PT WARD VISIT

## 2019-01-17 PROCEDURE — 85027 COMPLETE CBC AUTOMATED: CPT | Performed by: PHYSICIAN ASSISTANT

## 2019-01-17 PROCEDURE — 99232 SBSQ HOSP IP/OBS MODERATE 35: CPT | Performed by: HOSPITALIST

## 2019-01-17 PROCEDURE — 25000132 ZZH RX MED GY IP 250 OP 250 PS 637: Performed by: SURGERY

## 2019-01-17 PROCEDURE — 25000132 ZZH RX MED GY IP 250 OP 250 PS 637: Performed by: PHYSICIAN ASSISTANT

## 2019-01-17 PROCEDURE — 25000132 ZZH RX MED GY IP 250 OP 250 PS 637: Performed by: HOSPITALIST

## 2019-01-17 PROCEDURE — 36415 COLL VENOUS BLD VENIPUNCTURE: CPT | Performed by: PHYSICIAN ASSISTANT

## 2019-01-17 PROCEDURE — 97530 THERAPEUTIC ACTIVITIES: CPT | Mod: GP

## 2019-01-17 PROCEDURE — 82565 ASSAY OF CREATININE: CPT | Performed by: PHYSICIAN ASSISTANT

## 2019-01-17 PROCEDURE — 25000132 ZZH RX MED GY IP 250 OP 250 PS 637: Performed by: INTERNAL MEDICINE

## 2019-01-17 PROCEDURE — 97110 THERAPEUTIC EXERCISES: CPT | Mod: GP

## 2019-01-17 RX ORDER — ASPIRIN 325 MG
325 TABLET ORAL DAILY
Status: DISCONTINUED | OUTPATIENT
Start: 2019-01-17 | End: 2019-01-24 | Stop reason: HOSPADM

## 2019-01-17 RX ADMIN — METHIMAZOLE 5 MG: 5 TABLET ORAL at 06:35

## 2019-01-17 RX ADMIN — RANITIDINE 150 MG: 150 TABLET ORAL at 20:13

## 2019-01-17 RX ADMIN — ACETAMINOPHEN 1000 MG: 500 TABLET, FILM COATED ORAL at 06:35

## 2019-01-17 RX ADMIN — ASPIRIN 325 MG ORAL TABLET 325 MG: 325 PILL ORAL at 14:03

## 2019-01-17 RX ADMIN — ACETAMINOPHEN 1000 MG: 500 TABLET, FILM COATED ORAL at 14:03

## 2019-01-17 RX ADMIN — LISINOPRIL 20 MG: 20 TABLET ORAL at 20:13

## 2019-01-17 RX ADMIN — RANITIDINE 150 MG: 150 TABLET ORAL at 08:37

## 2019-01-17 RX ADMIN — CITALOPRAM HYDROBROMIDE 10 MG: 10 TABLET ORAL at 20:13

## 2019-01-17 RX ADMIN — POLYETHYLENE GLYCOL 3350 17 G: 17 POWDER, FOR SOLUTION ORAL at 08:37

## 2019-01-17 ASSESSMENT — ACTIVITIES OF DAILY LIVING (ADL)
ADLS_ACUITY_SCORE: 18
ADLS_ACUITY_SCORE: 22
ADLS_ACUITY_SCORE: 22
ADLS_ACUITY_SCORE: 23
ADLS_ACUITY_SCORE: 22
ADLS_ACUITY_SCORE: 18

## 2019-01-17 NOTE — PLAN OF CARE
Scheduled Tylenol given for pain, up with 2 assist with physical therapists, turned/repositioned with 2 assist throughout shift, purewick was in place at start of shift - no output - patient was bladder scanned for 477cc and strait cathed for 450cc - patient is due to void and purewick is out - incontinence pad in place, patient needs encouragement with meals and to drink - family assists with this, and patient will discharge to TCU when placement confirmed and cleared for discharge.

## 2019-01-17 NOTE — PROGRESS NOTES
[facility]    Hospitalist Progress Note      Assessment & Plan   Renetta Baker is a 83 year old female with a past medical history significant for dementia, hypertension, hyperthyroidism, and depression who was admitted on 1/12/2019 after a fall sustaining multiple fractures.      # Mechanical fall  # Left distal radius fracture s/p reduction  # Left proximal humerus fracture  # Left superior/inferior pubic rami fractures.   # Bilateral sacral alar fractures    - unwitnessed fall down two carpeted stairs and sustained injuries with above fractures  -orthopedics following  -left upper extremity in a sling  -pain controlled with scheduled Tylenol and PRN oxycodone  - marked swelling LUE sec to fracture, no s/o compartment syndrome  -- WBAT lower extremities, NWB LUE; splint left UE  - PT/OT following and rec TCU but patient apparently doesn't have insurance  -very limited mobility, hardly been getting out of bed  - stable Hb ; will start  daily for DVT prophylaxis     # Anemia.   - Per PCP records baseline hgb seems to be around 11-12. Hgb on admission 10.5, down to 7.6 on 1/14 and then stable around 7  - Unclear source though suspect she may have some bleeding related to arm and pelvic fractures. No mention of hematoma in pelvic CT read; not on any anticoagulation; no s/o active bleed  - will monitor Hb    # Hypertension. Continue prior to admission lisinopril     # Hyperthyroidism. Continue prior to admission methimazole     # Depression. Continue prior to admission Celexa     # History of urinary incontinence. Holding prior to admission Oxybutynin with concurrent use of opioids to help prevent delirium; getting intermittent straight cath    DVT Prophylaxis: will start  daily; PCDs  Code Status: Full Code    Disposition: Expected discharge 2-3 days pending TCU placement; will have  for disposition planning    Miguel Denson    Interval History   - talk to patient with her  daughter interpreting ; hardly been getting out of bed; pain seems reasonably well controlled on current regimen; poor PO intake     -Data reviewed today: I reviewed all new labs and imaging results over the last 24 hours. I personally reviewed the xray/CT image(s) showing the fractures.    Physical Exam   Temp: 99.5  F (37.5  C) Temp src: Oral BP: 128/67 Pulse: 74   Resp: 16 SpO2: 97 % O2 Device: None (Room air)    Vitals:    01/12/19 2030 01/16/19 0653   Weight: 51.7 kg (113 lb 14.4 oz) 54.8 kg (120 lb 13 oz)     Vital Signs with Ranges  Temp:  [98.9  F (37.2  C)-99.9  F (37.7  C)] 99.5  F (37.5  C)  Pulse:  [68-82] 74  Resp:  [16] 16  BP: (125-133)/(67-73) 128/67  SpO2:  [96 %-97 %] 97 %  I/O last 3 completed shifts:  In: 420 [P.O.:420]  Out: 240 [Urine:240]    Constitutional: Appears comfortable, laying down in bed. Communicates through family.   ENT: moist mucous membranes  Respiratory: Lungs clear to auscultation bilaterally, no increased work of breathing or wheezing  Cardiovascular: Regular rate and rhythm, no murmur, no LE edema  GI:positive bowel sounds, abdomen soft, non-tender  Skin/Integumen: warm, dry. Left upper extremity with significant swelling with posterior ecchymosis.    MSK:  Calves are non-tender. Intact dorsi and plantar flexion bilaterally. Hip flexion intact. Splint and sling in place LUE. Fingers of LUE are warm, patient can wiggle.   Neuro:  Sensation in extremities intact. Face symmetric. No focal deficits.       Medications       acetaminophen  1,000 mg Oral Q8H     citalopram  10 mg Oral QPM     lisinopril  20 mg Oral QPM     methimazole  5 mg Oral QAM AC     polyethylene glycol  17 g Oral Daily     ranitidine  150 mg Oral BID     sodium chloride (PF)  3 mL Intracatheter Q8H       Data   Recent Labs   Lab 01/17/19  0639 01/16/19  0717 01/15/19  0819  01/13/19  0638 01/12/19  1800   WBC 6.5  --   --   --  7.0 14.7*   HGB 7.7* 7.7* 7.6*   < > 8.5* 10.5*   MCV 86  --   --   --  86 87      --   --   --  161 223   NA  --   --   --   --  141 140   POTASSIUM  --   --   --   --  4.5 3.5   CHLORIDE  --   --   --   --  108 104   CO2  --   --   --   --  25 27   BUN  --   --   --   --  26 18   CR 0.58  --   --   --  0.85 0.66   ANIONGAP  --   --   --   --  8 9   VICENTE  --   --   --   --  7.7* 8.2*   GLC  --   --   --   --  131* 138*    < > = values in this interval not displayed.       No results found for this or any previous visit (from the past 24 hour(s)).

## 2019-01-17 NOTE — PROGRESS NOTES
Orthopedic Surgery  Renetta Baker  2019  Admit Date:  2019  Multiple fractures - non-operative  Left superior pubic rami fracture  Sacral ala fractures (bilateral)  Right iliac wing fracture?  Left proximal humerus fracture  Left distal radius fracture     Patient resting comfortably in bed.    Pain controlled.  Tolerating oral intake.    Denies nausea or vomiting  Denies chest pain or shortness of breath  No events overnight.      Alert and orient to person and situation  Daughter present at bedside to translate  Vital Sign Ranges  Temperature Temp  Av.4  F (37.4  C)  Min: 98.9  F (37.2  C)  Max: 99.9  F (37.7  C)   Blood pressure Systolic (24hrs), Av , Min:125 , Max:142        Diastolic (24hrs), Av, Min:67, Max:76      Pulse Pulse  Av.2  Min: 68  Max: 82   Respirations Resp  Av  Min: 16  Max: 16   Pulse oximetry SpO2  Av.8 %  Min: 96 %  Max: 97 %       Leg lengths equal  Negative log roll bilaterally  Hip flexes to 75 degrees without pain  Minimal erythema of the surrounding skin.   Bilateral calves are soft, non-tender.  Bilateral lower extremity is NVI.  Sensation intact bilateral lower extremities  Active dorsi and plantar flexion bilaterally  5/5 EHL  +Dp pulses     Sling in place  Left arm marked swollen but improving  Spotty ecchymosis over entirety of arm  Comparments compressible  Sensation intact in upper arm over biceps as well as in hand r/m/u nerve distribution  Able to abduct and oppose left thumb  Fingers warm and well perfused  +Radial pulse  +cap refill     Labs:  Recent Labs   Lab Test 19  0638 19  1800   POTASSIUM 4.5 3.5     Recent Labs   Lab Test 19  0639 19  0717 01/15/19  0819   HGB 7.7* 7.7* 7.6*     No results for input(s): INR in the last 60270 hours.  Recent Labs   Lab Test 19  0639 19  0638 19  1800    161 223       A/P  1. Plan              Continue SCDs for DVT prophylaxis.  Consider ASA once  hgb stable              Mobilize with PT/OT - likely only able to do bed ex (SLR) and transfers to chair/commode              Spirometry              May WBAT on LE with walker, likely will not tolerate more than pivot transfers to chair.                Non-Wb left UE              Continue splint left UE              q shift check of sensation and ROM              Continue current pain regiment.              Will continue to follow     2. Disposition              Anticipate d/c based on medical clearance        Alla Herron PA-C

## 2019-01-17 NOTE — PLAN OF CARE
A&O x4- nonenglish speaker but daughter at bedside helps with translating, refused  services VSS on RA, CMS intact, Drng CDI with sling on LUE, reposition q2hr with assist of 2, incontinent and purewick in place- needs a lot of encouragement with oral fluids, denied pain this shift, Diet reg, IV-SL. Continue to monitor.

## 2019-01-17 NOTE — PLAN OF CARE
AOx4. VSS. Regular diet. Stood at bedside with PT. Daughters repositioning. R PIV SL. LUE in sling. CMS intact. Does not speak english, daughters translating. Incontinent, purewick in place. Pain managed w/ scheduled tylenol. Family would like update from . Discharge pending.

## 2019-01-17 NOTE — CONSULTS
Consult acknowledged and already completed on 1/14. Met with pt and pt's dtr to discuss discharge plan. SW had already called financial counseling office and confirmed [employer] does not have TCU benefits. SW explained this to the dtr. SW explained pt would need to pay privately for TCU if this was still the plan. SW explained prices of various TCU's ([facility], [facility], and [facility]). Dtr was understanding of this information but wanted some time before she made a decision. SW offered to follow-up with dtr and pt tomorrow morning to see about their decision for TCU.     Alla Perez MSW, LGSW

## 2019-01-17 NOTE — PLAN OF CARE
Discharge Planner PT   Patient plan for discharge: TCU  Current status: Patient supine in bed upon arrival; agreeable and family member interpreted session.Patient participated in supine strengthening/ROM exercises. Increased pain in L > R LE. Completed supine to sit with HOB elevated, Mod A x 1 and CGA x 1 for safety. Static sitting x 8 minutes with SBA; denies increase in pain. Repeated sit <> stand transfer x 3 with Min A x 2. Patient tolerated up to 30 sec of static standing with Mod A x 1 before fatiguing. Up on EOB with nursing staff upon departure.   Barriers to return to prior living situation: Stairs, decreased mobility to be managed by family  Recommendations for discharge: TCU per plan established by the PT.  Rationale for recommendations: Pt has multiple fractures causing pain and mobility limitations due to NWB left UE and pain with weight bearing as tolerated in LE. Pt needs increased assist for all mobility and skilled care to improve mobility.  Entered by: Nhung Hong 01/17/2019 11:39 AM

## 2019-01-18 ENCOUNTER — APPOINTMENT (OUTPATIENT)
Dept: PHYSICAL THERAPY | Facility: CLINIC | Age: 84
End: 2019-01-18
Payer: COMMERCIAL

## 2019-01-18 LAB — HGB BLD-MCNC: 7.6 G/DL (ref 11.7–15.7)

## 2019-01-18 PROCEDURE — 25000132 ZZH RX MED GY IP 250 OP 250 PS 637: Performed by: HOSPITALIST

## 2019-01-18 PROCEDURE — 36415 COLL VENOUS BLD VENIPUNCTURE: CPT | Performed by: HOSPITALIST

## 2019-01-18 PROCEDURE — 25000132 ZZH RX MED GY IP 250 OP 250 PS 637: Performed by: SURGERY

## 2019-01-18 PROCEDURE — 25000132 ZZH RX MED GY IP 250 OP 250 PS 637: Performed by: PHYSICIAN ASSISTANT

## 2019-01-18 PROCEDURE — 40000193 ZZH STATISTIC PT WARD VISIT

## 2019-01-18 PROCEDURE — 85018 HEMOGLOBIN: CPT | Performed by: HOSPITALIST

## 2019-01-18 PROCEDURE — 25000132 ZZH RX MED GY IP 250 OP 250 PS 637: Performed by: INTERNAL MEDICINE

## 2019-01-18 PROCEDURE — 99232 SBSQ HOSP IP/OBS MODERATE 35: CPT | Performed by: HOSPITALIST

## 2019-01-18 PROCEDURE — 97530 THERAPEUTIC ACTIVITIES: CPT | Mod: GP

## 2019-01-18 PROCEDURE — 97110 THERAPEUTIC EXERCISES: CPT | Mod: GP

## 2019-01-18 PROCEDURE — 12000000 ZZH R&B MED SURG/OB

## 2019-01-18 RX ORDER — AMOXICILLIN 250 MG
1 CAPSULE ORAL 2 TIMES DAILY PRN
Status: DISCONTINUED | OUTPATIENT
Start: 2019-01-18 | End: 2019-01-24 | Stop reason: HOSPADM

## 2019-01-18 RX ORDER — AMOXICILLIN 250 MG
2 CAPSULE ORAL 2 TIMES DAILY
Status: DISCONTINUED | OUTPATIENT
Start: 2019-01-18 | End: 2019-01-24 | Stop reason: HOSPADM

## 2019-01-18 RX ORDER — AMOXICILLIN 250 MG
2 CAPSULE ORAL 2 TIMES DAILY PRN
Status: DISCONTINUED | OUTPATIENT
Start: 2019-01-18 | End: 2019-01-24 | Stop reason: HOSPADM

## 2019-01-18 RX ORDER — AMOXICILLIN 250 MG
1 CAPSULE ORAL 2 TIMES DAILY
Status: DISCONTINUED | OUTPATIENT
Start: 2019-01-18 | End: 2019-01-24 | Stop reason: HOSPADM

## 2019-01-18 RX ADMIN — CITALOPRAM HYDROBROMIDE 10 MG: 10 TABLET ORAL at 20:24

## 2019-01-18 RX ADMIN — ASPIRIN 325 MG ORAL TABLET 325 MG: 325 PILL ORAL at 10:31

## 2019-01-18 RX ADMIN — ACETAMINOPHEN 1000 MG: 500 TABLET, FILM COATED ORAL at 07:01

## 2019-01-18 RX ADMIN — METHIMAZOLE 5 MG: 5 TABLET ORAL at 07:01

## 2019-01-18 RX ADMIN — ACETAMINOPHEN 1000 MG: 500 TABLET, FILM COATED ORAL at 13:45

## 2019-01-18 RX ADMIN — RANITIDINE 150 MG: 150 TABLET ORAL at 20:23

## 2019-01-18 RX ADMIN — SENNOSIDES AND DOCUSATE SODIUM 1 TABLET: 8.6; 5 TABLET ORAL at 20:24

## 2019-01-18 RX ADMIN — ACETAMINOPHEN 1000 MG: 500 TABLET, FILM COATED ORAL at 22:30

## 2019-01-18 RX ADMIN — RANITIDINE 150 MG: 150 TABLET ORAL at 10:31

## 2019-01-18 RX ADMIN — LISINOPRIL 20 MG: 20 TABLET ORAL at 20:24

## 2019-01-18 ASSESSMENT — ACTIVITIES OF DAILY LIVING (ADL)
ADLS_ACUITY_SCORE: 23
ADLS_ACUITY_SCORE: 25
ADLS_ACUITY_SCORE: 23

## 2019-01-18 NOTE — PROGRESS NOTES
Long discussion with patients daughters Oneyda and Jazmin regarding discharge plan.. Daughters feel patient should be staying in hospital until able to mobilize independently. Daughters stating patient does not have coverage for TCU and family feels they may not be able to afford the cost of TCU/ Patient has 2 dtrs in the  and a daughter in [place].  Discussed that patient is medically stable for discharge tomorrow and if unable to care for patient at home family would need to think about private pay at the TCU. After much thought family wanted information to be sent to [facility] or [facility] in [place].  Received a call from  stating they will accept patient in AM .  Requesting downpayment of $3000.  Daughter aware,requested transportation. Discussed that it may incur some cost,unsure if insurance will pay for it. Daughter in agreement.  [facility] Transportation set for 3:30 pm 1/19 to [facility].

## 2019-01-18 NOTE — PROGRESS NOTES
[facility]    Hospitalist Progress Note      Assessment & Plan   Renetta Baker is a 83 year old female with a past medical history significant for dementia, hypertension, hyperthyroidism, and depression who was admitted on 1/12/2019 after a fall sustaining multiple fractures.      # Mechanical fall  # Left distal radius fracture s/p reduction  # Left proximal humerus fracture  # Left superior/inferior pubic rami fractures.   # Bilateral sacral alar fractures    - unwitnessed fall down two carpeted stairs and sustained injuries with above fractures  - orthopedics following  - left upper extremity in a sling  - pain controlled with scheduled Tylenol and PRN oxycodone  - marked swelling LUE sec to fracture, no s/o compartment syndrome  -- WBAT lower extremities, NWB LUE; splint left UE  - PT/OT following and rec TCU but patient apparently doesn't have insurance to cover for TCU  - SW following; family agrees to private pay for TCU; planned discharge to TCU in am  - to follow up with up with ortho in 2 weeks    # Constipation  - likely sec to poor PO, limited ambulation and narcotics  - bowel regimen ordered  - trying prune juice; can try suppository this afternoon with no results     # Anemia.   - Per PCP records baseline hgb seems to be around 11-12. Hgb on admission 10.5, down to 7.6 on 1/14 and then stable around 7  - Unclear source though suspect she may have some bleeding related to arm and pelvic fractures. No mention of hematoma in pelvic CT read; not on any anticoagulation; no s/o active bleed  - will monitor Hb    # Hypertension. Continue prior to admission lisinopril; BP controlled     # Hyperthyroidism. Continue prior to admission methimazole     # Dementia/Depression. Continue prior to admission Celexa     # History of urinary incontinence. Holding prior to admission Oxybutynin with concurrent use of opioids to help prevent delirium; getting intermittent straight cath    DVT  Prophylaxis:  daily; PCDs  Code Status: Full Code    Disposition: planned discharge to TCU in am    Miguel Denson    Interval History   - talked to patient with her daughter interpreting ; no acute issues overnight; did get out of bed to chair with two person assist; reports no bowel movement in two days    -Data reviewed today: I reviewed all new labs and imaging results over the last 24 hours    Physical Exam   Temp: 98.8  F (37.1  C) Temp src: Oral BP: 129/73 Pulse: 80   Resp: 16 SpO2: 98 % O2 Device: None (Room air)    Vitals:    01/12/19 2030 01/16/19 0653   Weight: 51.7 kg (113 lb 14.4 oz) 54.8 kg (120 lb 13 oz)     Vital Signs with Ranges  Temp:  [98.8  F (37.1  C)-99.7  F (37.6  C)] 98.8  F (37.1  C)  Pulse:  [74-80] 80  Resp:  [14-16] 16  BP: (125-138)/(66-80) 129/73  SpO2:  [94 %-98 %] 98 %  I/O last 3 completed shifts:  In: 960 [P.O.:960]  Out: 450 [Urine:450]    Constitutional: Appears comfortable, laying down in bed. Communicates through family.; no apparent distress; very pleasant   ENT: moist mucous membranes  Respiratory: Lungs clear to auscultation bilaterally, no increased work of breathing or wheezing  Cardiovascular: Regular rate and rhythm, no murmur, no LE edema  GI:positive bowel sounds, abdomen soft, non-tender  Skin/Integumen: warm, dry. Left upper extremity with significant swelling with posterior ecchymosis.    MSK:  Calves are non-tender. Intact dorsi and plantar flexion bilaterally. Hip flexion intact. Splint and sling in place LUE. Fingers of LUE are warm, patient can wiggle.   Neuro:  Sensation in extremities intact. Face symmetric. No focal deficits.       Medications       acetaminophen  1,000 mg Oral Q8H     aspirin  325 mg Oral Daily     citalopram  10 mg Oral QPM     lisinopril  20 mg Oral QPM     methimazole  5 mg Oral QAM AC     polyethylene glycol  17 g Oral Daily     ranitidine  150 mg Oral BID     sodium chloride (PF)  3 mL Intracatheter Q8H       Data   Recent  Labs   Lab 01/18/19  0702 01/17/19  0639 01/16/19  0717  01/13/19  0638 01/12/19  1800   WBC  --  6.5  --   --  7.0 14.7*   HGB 7.6* 7.7* 7.7*   < > 8.5* 10.5*   MCV  --  86  --   --  86 87   PLT  --  234  --   --  161 223   NA  --   --   --   --  141 140   POTASSIUM  --   --   --   --  4.5 3.5   CHLORIDE  --   --   --   --  108 104   CO2  --   --   --   --  25 27   BUN  --   --   --   --  26 18   CR  --  0.58  --   --  0.85 0.66   ANIONGAP  --   --   --   --  8 9   VICENTE  --   --   --   --  7.7* 8.2*   GLC  --   --   --   --  131* 138*    < > = values in this interval not displayed.       No results found for this or any previous visit (from the past 24 hour(s)).

## 2019-01-18 NOTE — PROGRESS NOTES
Orthopedic Surgery  Renetta Baker  2019  Admit Date:  2019  Multiple fractures - non-operative  Left superior pubic rami fracture  Sacral ala fractures (bilateral)  Right iliac wing fracture?  Left proximal humerus fracture  Left distal radius fracture     Patient resting comfortably in bed.    Pain controlled.  Tolerating oral intake.    Denies nausea or vomiting  Denies chest pain or shortness of breath  No events overnight.   Alert and orient to person, place, and time.  Patient daughter present at bedside to translate  Vital Sign Ranges  Temperature Temp  Av.9  F (37.2  C)  Min: 97.7  F (36.5  C)  Max: 99.7  F (37.6  C)   Blood pressure Systolic (24hrs), Av , Min:125 , Max:138        Diastolic (24hrs), Av, Min:66, Max:80      Pulse Pulse  Av.4  Min: 74  Max: 80   Respirations Resp  Av  Min: 16  Max: 16   Pulse oximetry SpO2  Av %  Min: 96 %  Max: 98 %       Leg lengths equal  Negative log roll bilaterally  Hip flexes to 45 degrees without pain  Minimal erythema of the surrounding skin.   Bilateral calves are soft, non-tender.  Bilateral lower extremity is NVI.  Sensation intact bilateral lower extremities  Active dorsi and plantar flexion bilaterally  5/5 EHL  +Dp pulses     Sling in place  Left arm marked swollen but improving  Spotty ecchymosis over entirety of arm and fingers  Comparments compressible  Sensation intact in upper arm over biceps as well as in hand r/m/u nerve distribution  Able to abduct and oppose left thumb  Fingers warm and well perfused  +Radial pulse  +cap refill   Denying pain along splint edges    Labs:  Recent Labs   Lab Test 19  0638 19  1800   POTASSIUM 4.5 3.5     Recent Labs   Lab Test 19  0702 19  0639 19  0717   HGB 7.6* 7.7* 7.7*     No results for input(s): INR in the last 43233 hours.  Recent Labs   Lab Test 19  0639 19  0638 19  1800    161 223        A/P  1. Plan              Continue SCDs for DVT prophylaxis.  Consider ASA once hgb stable              Mobilize with PT/OT - likely only able to do bed ex (SLR) and transfers to      chair/commode              Spirometry              May WBAT on LE with walker, likely will not tolerate more than pivot transfers to      chair.                          Non-Wb left UE              Continue splint left UE              q shift check of sensation and ROM              Continue current pain regiment.              Will continue to follow     2. Disposition              Anticipate transfer to TCU tomorrow.       Follow-up with ortho in 2 weeks.     Alla Herron PA-C

## 2019-01-18 NOTE — PLAN OF CARE
Discharge Planner PT   Patient plan for discharge: TCU  Current status: Patient supine in bed upon arrival; agreeable and family member interpreted session. Patient participated in supine strengthening/ROM exercises. Increased pain in L > R LE. Completed supine to sit with HOB elevated, Max A x 1. Static sitting x 3 minutes with SBA; increased pain in L groin. Repeated sit <> stand transfer x 2 with Mod A x 1. Static standing x 1 minute. Transferred to wheelchair with Min A x 2. Patient was able to take multiple small pivots to chair. Uncontrolled descent due to fatigue. Up in chair at end of session. Positioned for comfort.   Barriers to return to prior living situation: Stairs, decreased mobility to be managed by family  Recommendations for discharge: TCU per plan established by the PT.  Rationale for recommendations: Pt has multiple fractures causing pain and mobility limitations due to NWB left UE and pain with weight bearing as tolerated in LE. Pt needs increased assist for all mobility and skilled care to improve mobility.  Entered by: Nhung Hong 01/18/2019 11:14 AM

## 2019-01-18 NOTE — PROGRESS NOTES
ADENIKE   D: Called and updated [facility] on acceptance of TCU bed. Also provided update on when time for transport was arranged. Family already aware and accepting of private pay costs.   P: Will continue to follow and support a safe discharge plan    Alla WEINSTEIN, LGSW

## 2019-01-18 NOTE — PLAN OF CARE
VSS on RA. Alert, disoriented to time. Family at bedside to translate. Cast to LUE-NWB. WBAT to LLE. CMS intact, except swelling to LUE. LUE elevated, using ice packs. Pain well managed with prn Oxy and scheduled Tylenol. Tolerating reg diet. Denies nausea this shift. +BS. Bladder scanned 260cc at 2000. Incontinent of urine at 0400. PVR bladder scanned 76cc. Encouraged IS and oral fluid. Nursing continue to monitor.

## 2019-01-18 NOTE — PLAN OF CARE
Pt is A/O x2. Disoriented to place and situation. Pt non-english speaking, family at bedside to translate. VSS. Pain with movement, denies pain when still. LUE in cast, NWB. LLE WBAT. Pt stood at bedside and walked about 2 feet then needed to lay back down. Scheduled Tylenol for pain. Regular diet. Okay appetite. Discharge pending TCU decision and placement.

## 2019-01-18 NOTE — PROGRESS NOTES
"BRIEF NUTRITION ASSESSMENT      REASON FOR ASSESSMENT:  LOS      CURRENT DIET AND INTAKE:  Diet:  Regular            Room Service with Assist            Magic Cup at 10am and 2pm              Chart reviewed  Visited with pt's dtr in the hallway this morning (pt busy in the room)  Dtr tells me that pt's main c/o is constipation - \"she has been getting medication and we are hoping today is the day - she got up and that might get things moving\"  Intake has been ~50% on average  Dtr notes that pt does not like Boost (which she received a few days ago), but does like the Magic Cup and ate 50% this morning    Breakfast order - omelet, hash browns, flatbread, tea  Lunch - stir ospina, oatmeal, rice krispie bar, gingerale, french fries    Note that pt is on miralax daily (doses given 1/14, 1/16, 1/17)      ANTHROPOMETRICS:  Wt Readings from Last 10 Encounters:   01/16/19 54.8 kg (120 lb 13 oz)          LABS:  Labs noted    MALNUTRITION:  Patient does not meet two of the following criteria necessary for diagnosing malnutrition: significant weight loss, reduced intake, subcutaneous fat loss, muscle loss or fluid retention    NUTRITION INTERVENTION:  Nutrition Diagnosis:  No nutrition diagnosis at this time.    Implementation:  Nutrition Education ---> Discussed tips for constipation.  Will send pt a prune juice and hot tea on lunch tray.  Encouraged dtr to have pt drink liquids during the day.    Will continue to send a Magic Cup at 10am and 2pm for added cals/pro      FOLLOW UP/MONITORING:   Will re-evaluate in 7 - 10 days, or sooner, if re-consulted.          "

## 2019-01-19 PROCEDURE — 25000132 ZZH RX MED GY IP 250 OP 250 PS 637: Performed by: PHYSICIAN ASSISTANT

## 2019-01-19 PROCEDURE — 25000132 ZZH RX MED GY IP 250 OP 250 PS 637: Performed by: INTERNAL MEDICINE

## 2019-01-19 PROCEDURE — 12000000 ZZH R&B MED SURG/OB

## 2019-01-19 PROCEDURE — 99232 SBSQ HOSP IP/OBS MODERATE 35: CPT | Performed by: HOSPITALIST

## 2019-01-19 PROCEDURE — 25000132 ZZH RX MED GY IP 250 OP 250 PS 637: Performed by: HOSPITALIST

## 2019-01-19 PROCEDURE — 25000132 ZZH RX MED GY IP 250 OP 250 PS 637: Performed by: SURGERY

## 2019-01-19 RX ORDER — ACETAMINOPHEN 500 MG
1000 TABLET ORAL EVERY 8 HOURS
Qty: 180 TABLET | Refills: 0 | Status: SHIPPED | OUTPATIENT
Start: 2019-01-19 | End: 2019-02-18

## 2019-01-19 RX ORDER — ASPIRIN 325 MG
325 TABLET ORAL DAILY
Qty: 30 TABLET | Refills: 0 | Status: SHIPPED | OUTPATIENT
Start: 2019-01-20 | End: 2019-02-19

## 2019-01-19 RX ORDER — OXYCODONE HYDROCHLORIDE 5 MG/1
2.5-5 TABLET ORAL EVERY 4 HOURS PRN
Qty: 21 TABLET | Refills: 0 | Status: SHIPPED | OUTPATIENT
Start: 2019-01-19 | End: 2019-01-24

## 2019-01-19 RX ADMIN — LISINOPRIL 20 MG: 20 TABLET ORAL at 20:21

## 2019-01-19 RX ADMIN — ACETAMINOPHEN 1000 MG: 500 TABLET, FILM COATED ORAL at 14:12

## 2019-01-19 RX ADMIN — RANITIDINE 150 MG: 150 TABLET ORAL at 20:21

## 2019-01-19 RX ADMIN — ASPIRIN 325 MG ORAL TABLET 325 MG: 325 PILL ORAL at 09:31

## 2019-01-19 RX ADMIN — ACETAMINOPHEN 1000 MG: 500 TABLET, FILM COATED ORAL at 21:58

## 2019-01-19 RX ADMIN — SENNOSIDES AND DOCUSATE SODIUM 1 TABLET: 8.6; 5 TABLET ORAL at 20:20

## 2019-01-19 RX ADMIN — Medication 2.5 MG: at 20:25

## 2019-01-19 RX ADMIN — SENNOSIDES AND DOCUSATE SODIUM 2 TABLET: 8.6; 5 TABLET ORAL at 09:32

## 2019-01-19 RX ADMIN — ACETAMINOPHEN 1000 MG: 500 TABLET, FILM COATED ORAL at 06:56

## 2019-01-19 RX ADMIN — POLYETHYLENE GLYCOL 3350 17 G: 17 POWDER, FOR SOLUTION ORAL at 20:19

## 2019-01-19 RX ADMIN — CITALOPRAM HYDROBROMIDE 10 MG: 10 TABLET ORAL at 20:21

## 2019-01-19 RX ADMIN — RANITIDINE 150 MG: 150 TABLET ORAL at 09:31

## 2019-01-19 RX ADMIN — METHIMAZOLE 5 MG: 5 TABLET ORAL at 06:55

## 2019-01-19 ASSESSMENT — ACTIVITIES OF DAILY LIVING (ADL)
ADLS_ACUITY_SCORE: 23
ADLS_ACUITY_SCORE: 27
ADLS_ACUITY_SCORE: 23
ADLS_ACUITY_SCORE: 27
ADLS_ACUITY_SCORE: 23
ADLS_ACUITY_SCORE: 23

## 2019-01-19 NOTE — PROGRESS NOTES
Discharge Planner   Discharge Plans in progress: TCU referrals sent. Accepted at [facility].  Barriers to discharge plan: TCU was unaware that patient needed interpretor and does not have a private room available for family who plans to stay 24/7. Pt now being declined the shared room for today. SW canceled transport and rescheduled for tomorrow. SW spoke to dtr Derek ([phone]) to explain that discharge has been canceled due to:  1- Facility needs to arrange interpretor  2- Facility needs to see if they have a private room in order to accomodate family.    Dtr indicated they had requested a private room and will only go to [facility] tomorrow if private room is available.  Discharge Planner   Discharge Plans in progress: TCU placement   Barriers to discharge plan:   -Family requiring private room as someone plans to stay with patient 24/7.   -Facility cannot allow family to stay 24/7 in shared room that is available today.  -Facility cannot accept family as interpretor and needs to schedule interpretor.  Follow up plan: SW delayed discharge and will continue plan for discharge tomorrow.       Entered by: Enda Montenegro 01/19/2019 3:59 PM       DC orders: sent via [facility] (to [facility])  Scripts: script faxed at 1220  PAS:  PCS: faxed to  and placed on chart  Trans: [facility] transport via  at 1130 on Sunday 1/20/19.    SW to continue to follow and assist with discharge planning.

## 2019-01-19 NOTE — PLAN OF CARE
Confused at times. VSS. Transfer cancelled. Left arm in splint and sling. CMS intact. Taking diet fair. Incontinent of urine. Turned and positioned.

## 2019-01-19 NOTE — PLAN OF CARE
Pt is alert and disoriented to time. Confused @ times. Family @ bedside for translation. Repositioned per pt's comfort. Incontinent of urine. Denies chest pain or SOB. Complains of mild pain, relief w/ scheduled tylenol and ice pack. Discharging to TCU today. Will continue to monitor.

## 2019-01-19 NOTE — PLAN OF CARE
Multiple fractures, non operable. Family at bedside for translation. Pt A&O x3, confused to time. Low grade temp otherwise VSS, on RA. LS clear. Scheduled Tylenol given for pain, most in upper arm, decreased. CMS intact except for +2-3 edema in LUE finger, hand arm, denies numbness tingling, LUE 2-3/5 strength, arm elevated. Up A2 to chair. Reg diet, appetite and intake improving. Incont urine, prefers female care provides to change brief, blaader scanned 179cc at 1845. +BS, passing flatus, refused Miralax, prn juice ordered, scheduled senna ordered to start this evening. Plans to discharge to TCU tomorrow at 1530, transportation will be arranged. Nursing will continue to monitor.

## 2019-01-20 PROCEDURE — 25000132 ZZH RX MED GY IP 250 OP 250 PS 637: Performed by: HOSPITALIST

## 2019-01-20 PROCEDURE — 25000132 ZZH RX MED GY IP 250 OP 250 PS 637: Performed by: SURGERY

## 2019-01-20 PROCEDURE — 99232 SBSQ HOSP IP/OBS MODERATE 35: CPT | Performed by: HOSPITALIST

## 2019-01-20 PROCEDURE — 25000132 ZZH RX MED GY IP 250 OP 250 PS 637: Performed by: PHYSICIAN ASSISTANT

## 2019-01-20 PROCEDURE — 12000000 ZZH R&B MED SURG/OB

## 2019-01-20 PROCEDURE — 25000132 ZZH RX MED GY IP 250 OP 250 PS 637: Performed by: INTERNAL MEDICINE

## 2019-01-20 RX ADMIN — ACETAMINOPHEN 1000 MG: 500 TABLET, FILM COATED ORAL at 23:27

## 2019-01-20 RX ADMIN — SENNOSIDES AND DOCUSATE SODIUM 1 TABLET: 8.6; 5 TABLET ORAL at 21:25

## 2019-01-20 RX ADMIN — RANITIDINE 150 MG: 150 TABLET ORAL at 10:15

## 2019-01-20 RX ADMIN — LISINOPRIL 20 MG: 20 TABLET ORAL at 21:23

## 2019-01-20 RX ADMIN — ASPIRIN 325 MG ORAL TABLET 325 MG: 325 PILL ORAL at 10:15

## 2019-01-20 RX ADMIN — RANITIDINE 150 MG: 150 TABLET ORAL at 21:24

## 2019-01-20 RX ADMIN — METHIMAZOLE 5 MG: 5 TABLET ORAL at 10:15

## 2019-01-20 RX ADMIN — ACETAMINOPHEN 1000 MG: 500 TABLET, FILM COATED ORAL at 17:41

## 2019-01-20 RX ADMIN — ACETAMINOPHEN 1000 MG: 500 TABLET, FILM COATED ORAL at 10:15

## 2019-01-20 RX ADMIN — CITALOPRAM HYDROBROMIDE 10 MG: 10 TABLET ORAL at 21:24

## 2019-01-20 ASSESSMENT — ACTIVITIES OF DAILY LIVING (ADL)
ADLS_ACUITY_SCORE: 24

## 2019-01-20 NOTE — PROGRESS NOTES
[facility]    Hospitalist Progress Note      Assessment & Plan   Renetta Baker is a 83 year old female with a past medical history significant for dementia, hypertension, hyperthyroidism, and depression who was admitted on 1/12/2019 after a fall sustaining multiple fractures.      # Mechanical fall  # Left distal radius fracture s/p reduction  # Left proximal humerus fracture  # Left superior/inferior pubic rami fractures.   # Bilateral sacral alar fractures    - unwitnessed fall down two carpeted stairs and sustained injuries with above fractures  - orthopedics following  - left upper extremity in a sling  - pain controlled with scheduled Tylenol and PRN oxycodone  - marked swelling LUE sec to fracture, no s/o compartment syndrome  -- WBAT lower extremities, NWB LUE; splint left UE  - PT/OT following and rec TCU but patient apparently doesn't have insurance to cover for TCU  - SW following; family agrees to private pay for TCU; planned discharge to TCU in am  - to follow up with up with ortho in 2 weeks    # Constipation  - likely sec to poor PO, limited ambulation and narcotics  - bowel regimen ordered  - trying prune juice; can try suppository this afternoon with no results     # Anemia.   - Per PCP records baseline hgb seems to be around 11-12. Hgb on admission 10.5, down to 7.6 on 1/14 and then stable around 7  - Unclear source though suspect she may have some bleeding related to arm and pelvic fractures. No mention of hematoma in pelvic CT read; not on any anticoagulation; no s/o active bleed  - will monitor Hb    # Hypertension. Continue prior to admission lisinopril; BP controlled     # Hyperthyroidism. Continue prior to admission methimazole     # Dementia/Depression. Continue prior to admission Celexa     # History of urinary incontinence. Holding prior to admission Oxybutynin with concurrent use of opioids to help prevent delirium; getting intermittent straight cath    DVT  Prophylaxis:  daily; PCDs  Code Status: Full Code    Disposition: planned discharge to TCU in     [place]    Interval History   - talked to patient with her daughter interpreting ; no acute issues overnight; did get out of bed to chair with two person assist; reports no bowel movement in two days    -Data reviewed today: I reviewed all new labs and imaging results over the last 24 hours    Physical Exam   Temp: 98.3  F (36.8  C) Temp src: Oral BP: 117/68 Pulse: 74   Resp: 16 SpO2: 96 % O2 Device: None (Room air)    Vitals:    01/12/19 2030 01/16/19 0653   Weight: 51.7 kg (113 lb 14.4 oz) 54.8 kg (120 lb 13 oz)     Vital Signs with Ranges  Temp:  [98.3  F (36.8  C)-100.6  F (38.1  C)] 98.3  F (36.8  C)  Pulse:  [73-77] 74  Resp:  [16] 16  BP: (117-154)/(68-85) 117/68  SpO2:  [95 %-97 %] 96 %  I/O last 3 completed shifts:  In: 350 [P.O.:350]  Out: -     Constitutional: Appears comfortable, laying down in bed. Communicates through family.; no apparent distress; very pleasant   ENT: moist mucous membranes  Respiratory: Lungs clear to auscultation bilaterally, no increased work of breathing or wheezing  Cardiovascular: Regular rate and rhythm, no murmur, no LE edema  GI:positive bowel sounds, abdomen soft, non-tender  Skin/Integumen: warm, dry. Left upper extremity with significant swelling with posterior ecchymosis.    MSK:  Calves are non-tender. Intact dorsi and plantar flexion bilaterally. Hip flexion intact. Splint and sling in place LUE. Fingers of LUE are warm, patient can wiggle.   Neuro:  Sensation in extremities intact. Face symmetric. No focal deficits.       Medications       acetaminophen  1,000 mg Oral Q8H     aspirin  325 mg Oral Daily     citalopram  10 mg Oral QPM     lisinopril  20 mg Oral QPM     methimazole  5 mg Oral QAM AC     polyethylene glycol  17 g Oral Daily     ranitidine  150 mg Oral BID     senna-docusate  1 tablet Oral BID    Or     senna-docusate  2 tablet Oral  BID     sodium chloride (PF)  3 mL Intracatheter Q8H       Data   Recent Labs   Lab 01/18/19  0702 01/17/19  0639 01/16/19  0717   WBC  --  6.5  --    HGB 7.6* 7.7* 7.7*   MCV  --  86  --    PLT  --  234  --    CR  --  0.58  --        No results found for this or any previous visit (from the past 24 hour(s)).

## 2019-01-20 NOTE — PROGRESS NOTES
Discharge Planner   Discharge Plans in progress: Pt to discharge to [facility] on Monday, 1/21/19, once TCU is able to acquire  and notifies Hospital SW of time. Facility will also need to be able to provide private room for patient as family is intending to stay 24/7 with patient.  Barriers to discharge plan: Previous discharge plan canceled as facility not able to accomodate family staying in room as patient was slated for shared room. Facility also was not aware that patient required an interpretor and informed that legally they cannot rely on family interpreting.   Follow up plan: Awaiting facility arrangements for private room and interpretor.        Entered by: Edan Montenegro 01/20/2019 5:14 PM

## 2019-01-20 NOTE — PLAN OF CARE
A&O x3, disoriented to time. VSS except Tmax 100.6, Tylenol given. On RA. Daughter at bedside to translate. CMS intact. No c/o pain.  Reg diet.  Encouraged IS and oral fluid. Incont. urine. Daughter requested for pt not to be woken up during the night.

## 2019-01-21 ENCOUNTER — APPOINTMENT (OUTPATIENT)
Dept: PHYSICAL THERAPY | Facility: CLINIC | Age: 84
End: 2019-01-21
Payer: COMMERCIAL

## 2019-01-21 PROCEDURE — 99232 SBSQ HOSP IP/OBS MODERATE 35: CPT | Performed by: INTERNAL MEDICINE

## 2019-01-21 PROCEDURE — 25000132 ZZH RX MED GY IP 250 OP 250 PS 637: Performed by: PHYSICIAN ASSISTANT

## 2019-01-21 PROCEDURE — 40000193 ZZH STATISTIC PT WARD VISIT: Performed by: PHYSICAL THERAPY ASSISTANT

## 2019-01-21 PROCEDURE — 25000132 ZZH RX MED GY IP 250 OP 250 PS 637: Performed by: SURGERY

## 2019-01-21 PROCEDURE — 25000132 ZZH RX MED GY IP 250 OP 250 PS 637: Performed by: HOSPITALIST

## 2019-01-21 PROCEDURE — 97530 THERAPEUTIC ACTIVITIES: CPT | Mod: GP | Performed by: PHYSICAL THERAPY ASSISTANT

## 2019-01-21 PROCEDURE — 12000000 ZZH R&B MED SURG/OB

## 2019-01-21 PROCEDURE — 25000132 ZZH RX MED GY IP 250 OP 250 PS 637: Performed by: INTERNAL MEDICINE

## 2019-01-21 RX ORDER — FERROUS SULFATE 325(65) MG
325 TABLET, DELAYED RELEASE (ENTERIC COATED) ORAL EVERY OTHER DAY
Qty: 15 TABLET | Refills: 0 | DISCHARGE
Start: 2019-01-21 | End: 2019-02-20

## 2019-01-21 RX ORDER — SENNOSIDES 8.6 MG
2 TABLET ORAL DAILY
Qty: 60 TABLET | Refills: 0 | DISCHARGE
Start: 2019-01-21 | End: 2019-02-20

## 2019-01-21 RX ORDER — POLYETHYLENE GLYCOL 3350 17 G/17G
1 POWDER, FOR SOLUTION ORAL 2 TIMES DAILY PRN
Qty: 30 PACKET | Refills: 0 | DISCHARGE
Start: 2019-01-21 | End: 2019-02-20

## 2019-01-21 RX ADMIN — ACETAMINOPHEN 1000 MG: 500 TABLET, FILM COATED ORAL at 23:44

## 2019-01-21 RX ADMIN — ACETAMINOPHEN 1000 MG: 500 TABLET, FILM COATED ORAL at 08:30

## 2019-01-21 RX ADMIN — RANITIDINE 150 MG: 150 TABLET ORAL at 08:30

## 2019-01-21 RX ADMIN — LISINOPRIL 20 MG: 20 TABLET ORAL at 20:40

## 2019-01-21 RX ADMIN — RANITIDINE 150 MG: 150 TABLET ORAL at 20:42

## 2019-01-21 RX ADMIN — ACETAMINOPHEN 1000 MG: 500 TABLET, FILM COATED ORAL at 15:32

## 2019-01-21 RX ADMIN — ASPIRIN 325 MG ORAL TABLET 325 MG: 325 PILL ORAL at 08:30

## 2019-01-21 RX ADMIN — SENNOSIDES AND DOCUSATE SODIUM 2 TABLET: 8.6; 5 TABLET ORAL at 08:30

## 2019-01-21 RX ADMIN — SENNOSIDES AND DOCUSATE SODIUM 2 TABLET: 8.6; 5 TABLET ORAL at 20:42

## 2019-01-21 RX ADMIN — CITALOPRAM HYDROBROMIDE 10 MG: 10 TABLET ORAL at 20:42

## 2019-01-21 RX ADMIN — METHIMAZOLE 5 MG: 5 TABLET ORAL at 06:37

## 2019-01-21 RX ADMIN — POLYETHYLENE GLYCOL 3350 17 G: 17 POWDER, FOR SOLUTION ORAL at 08:30

## 2019-01-21 ASSESSMENT — ACTIVITIES OF DAILY LIVING (ADL)
ADLS_ACUITY_SCORE: 24
ADLS_ACUITY_SCORE: 25

## 2019-01-21 NOTE — PLAN OF CARE
Patient denies pain, voided and had BM on BSC this am, up with assist 2, also is incontinent, family in room, appetite fair, c/o nausea this am, denies nausea at this time, plans to discharge to TCU 1/21/19.

## 2019-01-21 NOTE — PROGRESS NOTES
ADENIKE  D: Called and left VM for [facility] to check on private room availability. [facility]  P: Will continue to follow and support a safe discharge plan    Alla Perez MSW, LGSW     ADDENDUM @1014: [facility] is unable to accept pt for admission. Possibly can accept pt later but Norovirus is at their facility and they need to get it under control first.     ADDENDUM @1125: [facility] will not have a private room available until Thursday. SW explained pt is ready for discharge today. ADENIKE explained she will look for other facilities but to please keep pt on the list in case other TCU bed cannot be found. [facility] agreed and will wait to hear from ADENIKE.     ADDENDUM @1231: Met with pt's dtr to give update on discharge plan. Dtr was understanding of still trying to find TCU with private room. SW will follow-up with pt and pt's dtr once she hears back from [facility].    ADDENDUM @1514: [facility] is not able to accept pt for admission today. ADENIKE put out additional referrals via DOD to [facility], [facility], and [facility]. Received call right away that [facility] does not have any female beds today. SW will wait to hear from [facility] and [facility]. Attempted to meet with pt's dtr but not in room. SW will follow-up with pt/dtr once she returns to the floor.     ADDENDUM @3145: Met with pt's dtr to provide update. Dtr was understanding but really wanted pt to go to [facility]. ADENIKE explained [facility] is full and unsure of when they will have a bed available. ADENIKE explained when pt is medically cleared for discharge pt needs to go to TCU that has an available bed. SW explained pt is ready for discharge today so additional referrals are needed. Dtr preferred to talk to her sister before providing additional choices. SW will follow-up with pt/dtr after she talks to her sister.

## 2019-01-21 NOTE — DISCHARGE SUMMARY
Discharge Addendum:     Please see Dr. Garza's discharge summary from yesterday, 1/20.   Discharge delayed for placement. Patient is going to a TCU where private room is available so family can stay with patient.   I made the following changes to discharge med orders.   - stopped oxybutynin given constipation and on narcotics  - added iron for blood loss anemia   - added bowel regimen  See my PN from today for further details.     Current Discharge Medication List      START taking these medications    Details   acetaminophen (TYLENOL) 500 MG tablet Take 2 tablets (1,000 mg) by mouth every 8 hours  Qty: 180 tablet, Refills: 0    Associated Diagnoses: Closed fracture of ramus of left pubis, initial encounter (H); Closed fracture of distal end of left radius, unspecified fracture morphology, initial encounter; Closed fracture of distal end of left ulna, unspecified fracture morphology, initial encounter; Multiple fractures      aspirin (ASA) 325 MG tablet Take 1 tablet (325 mg) by mouth daily  Qty: 30 tablet, Refills: 0    Associated Diagnoses: Multiple fractures      ferrous sulfate (FE TABS) 325 (65 Fe) MG EC tablet Take 1 tablet (325 mg) by mouth every other day  Qty: 15 tablet, Refills: 0    Associated Diagnoses: Anemia due to blood loss, acute      melatonin 1 MG TABS tablet Take 1 tablet (1 mg) by mouth nightly as needed for sleep  Qty: 15 tablet, Refills: 0    Associated Diagnoses: Closed fracture of proximal end of left humerus, unspecified fracture morphology, initial encounter; Closed fracture of ramus of left pubis, initial encounter (H); Closed fracture of distal end of left radius, unspecified fracture morphology, initial encounter; Closed fracture of distal end of left ulna, unspecified fracture morphology, initial encounter; Multiple fractures      oxyCODONE (ROXICODONE) 5 MG tablet Take 0.5-1 tablets (2.5-5 mg) by mouth every 4 hours as needed for moderate to severe pain  Qty: 21 tablet, Refills: 0     Associated Diagnoses: Closed fracture of proximal end of left humerus, unspecified fracture morphology, initial encounter; Closed fracture of ramus of left pubis, initial encounter (H); Closed fracture of distal end of left radius, unspecified fracture morphology, initial encounter; Closed fracture of distal end of left ulna, unspecified fracture morphology, initial encounter; Multiple fractures      polyethylene glycol (MIRALAX/GLYCOLAX) packet Take 17 g by mouth 2 times daily as needed for constipation  Qty: 30 packet, Refills: 0    Associated Diagnoses: Drug-induced constipation      ranitidine (ZANTAC) 150 MG tablet Take 1 tablet (150 mg) by mouth 2 times daily  Qty: 60 tablet, Refills: 0    Associated Diagnoses: Nausea and vomiting, intractability of vomiting not specified, unspecified vomiting type      sennosides (SENOKOT) 8.6 MG tablet Take 2 tablets by mouth daily While using narcotics  Qty: 60 tablet, Refills: 0    Associated Diagnoses: Multiple fractures         CONTINUE these medications which have NOT CHANGED    Details   ALENDRONATE SODIUM PO Take by mouth once a week      citalopram (CELEXA) 10 MG tablet Take 10 mg by mouth every evening       lisinopril (PRINIVIL/ZESTRIL) 20 MG tablet Take 20 mg by mouth every evening       methimazole (TAPAZOLE) 5 MG tablet Take 5 mg by mouth every morning (before breakfast)          STOP taking these medications       oxybutynin (DITROPAN) 5 MG tablet Comments:   Reason for Stopping:

## 2019-01-21 NOTE — PROGRESS NOTES
[facility]    Medicine Progress Note - Hospitalist Service       Date of Admission:  1/12/2019  Assessment & Plan   Renetta Baker was admitted on 1/12/2019 by Chanell Painting MD and I would refer you to their history and physical.  The following problems were addressed during her hospitalization:  Renetta Baker is a 83 year old female with a past medical history significant for dementia, hypertension, hyperthyroidism, and depression who was admitted on 1/12/2019 after a fall sustaining multiple fractures.      # Mechanical fall  # Left distal radius fracture s/p reduction  # Left proximal humerus fracture  # Left superior/inferior pubic rami fractures.   # Bilateral sacral alar fractures     - unwitnessed fall down two carpeted stairs and sustained injuries with above fractures  - orthopedics following  - left upper extremity in a sling  - pain controlled with scheduled Tylenol and PRN oxycodone  - marked swelling LUE sec to fracture, no s/o compartment syndrome  -- WBAT lower extremities, NWB LUE; splint left UE  -- PT/OT following and rec TCU  -- to follow up with up with ortho in 2 weeks     # Constipation  - likely sec to poor PO, limited ambulation and narcotics  - senna added to discharge orders.      # Anemia.   - Per PCP records baseline hgb seems to be around 11-12. Hgb on admission 10.5, down to 7.6 on 1/14 and then stable around 7  - Unclear source though suspect she may have some bleeding related to arm and pelvic fractures. No mention of hematoma in pelvic CT read; not on any anticoagulation; no s/o active bleed  - added iron to discharge medications.   Recent Labs   Lab 01/18/19  0702 01/17/19  0639 01/16/19  0717 01/15/19  0819 01/14/19  1032   HGB 7.6* 7.7* 7.7* 7.6* 7.6*        # Hypertension. Continue prior to admission lisinopril; BP controlled     # Hyperthyroidism. Continue prior to admission methimazole     # Dementia/Depression. Continue prior to admission  Celexa     # History of urinary incontinence. Holding prior to admission Oxybutynin with concurrent use of opioids to help prevent delirium; getting intermittent straight cath     DVT Prophylaxis:  daily; PCDs  Code Status: Full Code      Diet: Regular Diet Adult  Room Service  Snacks/Supplements Pediatric: Magic Cup; Between Meals  Advance Diet as Tolerated      Chatterjee Catheter: not present    Disposition Plan   Expected discharge: Today, recommended to transitional care unit once pending placement per social work.  Entered: Maite Hutton MD 01/21/2019, 8:50 AM       The patient's care was discussed with the Bedside Nurse and Patient's Family.    Maite Hutton MD  Hospitalist Service  [facility]    ______________________________________________________________________    Interval History   Doing well today. Son acted as interpreted. Patient has a some pain in her right groin and arm when she moves them but otherwise doing well. No SOB, CP, abdominal pain.     Data reviewed today: I reviewed all medications, new labs and imaging results over the last 24 hours. I personally reviewed no images or EKG's today.    Physical Exam   Vital Signs: Temp: 97.8  F (36.6  C) Temp src: Oral BP: 107/67 Pulse: 67   Resp: 16 SpO2: 96 % O2 Device: None (Room air)    Weight: 120 lbs 12.99 oz  Constitutional: NAD, frail  Neuropsyche:  alert and oriented, answers questions appropriately.   Respiratory:  Breathing comfortably, good air exchange, no wheezes, no crackles.   Cardiovascular:  Regular rate and rhythm, no edema.  GI:  soft, NT/ND, BS normal  Skin/Integumen:  No acute rash, bruising or sign of bleeding.   MSK: bruising over right arm, arm in sling. Able to bend, lift and straighten legs.       Data   Recent Labs   Lab 01/18/19  0702 01/17/19  0639 01/16/19  0717   WBC  --  6.5  --    HGB 7.6* 7.7* 7.7*   MCV  --  86  --    PLT  --  234  --    CR  --  0.58  --      No results found for this or any  previous visit (from the past 24 hour(s)).  Medications       acetaminophen  1,000 mg Oral Q8H     aspirin  325 mg Oral Daily     citalopram  10 mg Oral QPM     lisinopril  20 mg Oral QPM     methimazole  5 mg Oral QAM AC     polyethylene glycol  17 g Oral Daily     ranitidine  150 mg Oral BID     senna-docusate  1 tablet Oral BID    Or     senna-docusate  2 tablet Oral BID     sodium chloride (PF)  3 mL Intracatheter Q8H

## 2019-01-21 NOTE — PLAN OF CARE
Discharge Planner PT   Patient plan for discharge: TCU  Current status: Pt performed supine to sit transfer with mod assist and increased time due to pain.  Sit to/from stand transfer with mod assist.  Pt amb a few small steps forward with hand hold assist on the right.  Very slow pace.  Pt needed to use the commode during session.  Mod assist with transfer on/off commode.  Dependent for hygiene cares and donning/doffing briefs.  Pt c/o feeling light-headed following use of commode so pt transferred from commode to bed with mod assist.  Mod assist of 2 for sit to supine transfer.  Daughter present and interpreted during session.  Barriers to return to prior living situation: Stairs, decreased mobility to be managed by family  Recommendations for discharge: TCU per plan established by the PT.  Rationale for recommendations: Pt has multiple fractures causing pain and mobility limitations due to NWB left UE and pain with weight bearing as tolerated in LE. Pt needs increased assist for all mobility and skilled care to improve mobility.           Entered by: Erin Finnegan 01/21/2019 2:25 PM

## 2019-01-21 NOTE — PLAN OF CARE
Pt Alert, Family at bedside to aide in interpretations. VSS on RA. Pain managed with scheduled tylenol. Incontinent at times. Discharge pending placement. Call light within reach, non slip socks on when OOB & bed in lowest/locked position.

## 2019-01-21 NOTE — PLAN OF CARE
VSS. A&O. Up with 2. Acetaminophen for pain. C/O itching at cast site. Tolerating RD. Incontinent at times. Discharge pending on placement.

## 2019-01-21 NOTE — PLAN OF CARE
Pt is A&O, very pleasant. Family @ bedside to translate. VSS on RA. Denies chest pain or SOB. Minimal pain well managed w/ scheduled tylenol. Incontinent of urine. Repositioned per pt's comfort. Slept well overnight. Plan to discharge today.

## 2019-01-22 PROCEDURE — 25000132 ZZH RX MED GY IP 250 OP 250 PS 637: Performed by: SURGERY

## 2019-01-22 PROCEDURE — 99232 SBSQ HOSP IP/OBS MODERATE 35: CPT | Performed by: INTERNAL MEDICINE

## 2019-01-22 PROCEDURE — 12000000 ZZH R&B MED SURG/OB

## 2019-01-22 PROCEDURE — 25000132 ZZH RX MED GY IP 250 OP 250 PS 637: Performed by: PHYSICIAN ASSISTANT

## 2019-01-22 PROCEDURE — 25000132 ZZH RX MED GY IP 250 OP 250 PS 637: Performed by: HOSPITALIST

## 2019-01-22 PROCEDURE — 25000132 ZZH RX MED GY IP 250 OP 250 PS 637: Performed by: INTERNAL MEDICINE

## 2019-01-22 RX ORDER — FERROUS SULFATE 325(65) MG
325 TABLET ORAL EVERY OTHER DAY
Status: DISCONTINUED | OUTPATIENT
Start: 2019-01-22 | End: 2019-01-24 | Stop reason: HOSPADM

## 2019-01-22 RX ADMIN — LISINOPRIL 20 MG: 20 TABLET ORAL at 21:24

## 2019-01-22 RX ADMIN — RANITIDINE 150 MG: 150 TABLET ORAL at 21:25

## 2019-01-22 RX ADMIN — ACETAMINOPHEN 1000 MG: 500 TABLET, FILM COATED ORAL at 08:21

## 2019-01-22 RX ADMIN — ASPIRIN 325 MG ORAL TABLET 325 MG: 325 PILL ORAL at 08:21

## 2019-01-22 RX ADMIN — SENNOSIDES AND DOCUSATE SODIUM 2 TABLET: 8.6; 5 TABLET ORAL at 08:21

## 2019-01-22 RX ADMIN — CITALOPRAM HYDROBROMIDE 10 MG: 10 TABLET ORAL at 21:25

## 2019-01-22 RX ADMIN — POLYETHYLENE GLYCOL 3350 17 G: 17 POWDER, FOR SOLUTION ORAL at 08:21

## 2019-01-22 RX ADMIN — METHIMAZOLE 5 MG: 5 TABLET ORAL at 08:22

## 2019-01-22 RX ADMIN — FERROUS SULFATE TAB 325 MG (65 MG ELEMENTAL FE) 325 MG: 325 (65 FE) TAB at 17:01

## 2019-01-22 RX ADMIN — RANITIDINE 150 MG: 150 TABLET ORAL at 08:21

## 2019-01-22 RX ADMIN — ACETAMINOPHEN 1000 MG: 500 TABLET, FILM COATED ORAL at 16:17

## 2019-01-22 ASSESSMENT — ACTIVITIES OF DAILY LIVING (ADL)
ADLS_ACUITY_SCORE: 26
ADLS_ACUITY_SCORE: 24
ADLS_ACUITY_SCORE: 26
ADLS_ACUITY_SCORE: 24

## 2019-01-22 NOTE — PROGRESS NOTES
ADENIKE  D: Received call from [facility]-they will not have a private room available until Thursday. Called and left VM with [facility] to check on bed availability. Called and spoke with [facility]-they will look at pt's insurance to verify there isn't any TCU coverage and will then call ADENIKE back. [facility] does have a private room available for pt.   P: Will continue to follow and support a safe discharge plan    Alla Perez MSW, LGSW     ADDENDUM @5203: [facility] not have any beds. [facility] is unable to accept pt for admission due to private pay status.     ADDENDUM @4153: Additional referrals sent. Received call from [facility] that they do not anticipate having an opening until Friday. ADENIKE asked [facility] to please hold on to referral in case something opens up sooner.

## 2019-01-22 NOTE — PROGRESS NOTES
[facility]    Medicine Progress Note - Hospitalist Service       Date of Admission:  1/12/2019  Assessment & Plan   Renetta Baker was admitted on 1/12/2019 by Chanell Painting MD and I would refer you to their history and physical.  The following problems were addressed during her hospitalization:  Renetta Baker is a 83 year old female with a past medical history significant for dementia, hypertension, hyperthyroidism, and depression who was admitted on 1/12/2019 after a fall sustaining multiple fractures.      Mechanical fall  Left distal radius fracture s/p reduction  Left proximal humerus fracture  Left superior/inferior pubic rami fractures.   Bilateral sacral alar fractures     - unwitnessed fall down two carpeted stairs and sustained injuries with above fractures  - orthopedics following  - left upper extremity in a sling  - pain controlled with scheduled Tylenol and PRN oxycodone (not using)   -- WBAT lower extremities, NWB LUE; splint left UE  -- PT/OT following and rec TCU  -- to follow up with up with ortho in 2 weeks     Constipation, resolved   - likely sec to poor PO, limited ambulation and narcotics  - miralax daily and senna ordered.      Anemia.   - Per PCP records baseline hgb seems to be around 11-12. Hgb on admission 10.5, down to 7.6 on 1/14 and then stable around 7  - Unclear source though suspect she may have some bleeding related to arm and pelvic fractures. No mention of hematoma in pelvic CT read; not on any anticoagulation; no s/o active bleed  - start every other day iron   Recent Labs   Lab 01/18/19  0702 01/17/19  0639 01/16/19  0717   HGB 7.6* 7.7* 7.7*        Hypertension. Continue prior to admission lisinopril; BP controlled     Hyperthyroidism. Continue prior to admission methimazole     Dementia/Depression. Continue prior to admission Celexa     History of urinary incontinence. Holding prior to admission Oxybutynin with concurrent use of opioids to help  prevent delirium; getting intermittent straight cath     DVT Prophylaxis:  daily; PCDs  Code Status: Full Code      Diet: Regular Diet Adult  Room Service  Snacks/Supplements Pediatric: Magic Cup; Between Meals  Advance Diet as Tolerated      Chatterjee Catheter: not present    Disposition Plan   Expected discharge: Today, recommended to transitional care unit once pending placement per social work.  Entered: Maite Hutton MD 01/22/2019, 2:53 PM       The patient's care was discussed with the Bedside Nurse and Patient's Family.    Maite Hutton MD  Hospitalist Service  [facility]    ______________________________________________________________________    Interval History   Doing well today. Son acted as interpreted. Patient has a some pain in her right groin and arm when she moves them but otherwise doing well. No SOB, CP, abdominal pain.     Data reviewed today: I reviewed all medications, new labs and imaging results over the last 24 hours. I personally reviewed no images or EKG's today.    Physical Exam   Vital Signs: Temp: 99.1  F (37.3  C) Temp src: Oral BP: 99/56 Pulse: 73   Resp: 16 SpO2: 95 % O2 Device: None (Room air)    Weight: 120 lbs 12.99 oz  Constitutional: NAD, frail  Neuropsyche:  alert and oriented, answers questions appropriately. Very sweet affect.   Respiratory:  Breathing comfortably, good air exchange, no wheezes, no crackles.   Cardiovascular:  Regular rate and rhythm, no edema.  GI:  soft, NT/ND, BS normal  Skin/Integumen:  No acute rash, bruising or sign of bleeding.   MSK: bruising over right arm, L arm in sling. Able to bend, lift and straighten legs.       Data   Recent Labs   Lab 01/18/19  0702 01/17/19  0639 01/16/19  0717   WBC  --  6.5  --    HGB 7.6* 7.7* 7.7*   MCV  --  86  --    PLT  --  234  --    CR  --  0.58  --      No results found for this or any previous visit (from the past 24 hour(s)).  Medications       acetaminophen  1,000 mg Oral Q8H      aspirin  325 mg Oral Daily     citalopram  10 mg Oral QPM     lisinopril  20 mg Oral QPM     methimazole  5 mg Oral QAM AC     polyethylene glycol  17 g Oral Daily     ranitidine  150 mg Oral BID     senna-docusate  1 tablet Oral BID    Or     senna-docusate  2 tablet Oral BID

## 2019-01-22 NOTE — PLAN OF CARE
Patient up with assist of 2 and gait belt; NWB LUE.  Pain managed with scheduled Tylenol and ice.  VSS on RA.  A/Ox4; Farsi speaking, family present at bedside to interpret for patient.  CMS intact.  LUE in sling.  Voiding adequately.  BM x2 today.  Good PO intake.  Discharge to TCU pending placement.

## 2019-01-23 ENCOUNTER — APPOINTMENT (OUTPATIENT)
Dept: PHYSICAL THERAPY | Facility: CLINIC | Age: 84
End: 2019-01-23
Payer: COMMERCIAL

## 2019-01-23 PROCEDURE — 12000000 ZZH R&B MED SURG/OB

## 2019-01-23 PROCEDURE — 25000132 ZZH RX MED GY IP 250 OP 250 PS 637: Performed by: INTERNAL MEDICINE

## 2019-01-23 PROCEDURE — 25000132 ZZH RX MED GY IP 250 OP 250 PS 637: Performed by: PHYSICIAN ASSISTANT

## 2019-01-23 PROCEDURE — 97530 THERAPEUTIC ACTIVITIES: CPT | Mod: GP | Performed by: PHYSICAL THERAPY ASSISTANT

## 2019-01-23 PROCEDURE — 25000132 ZZH RX MED GY IP 250 OP 250 PS 637: Performed by: SURGERY

## 2019-01-23 PROCEDURE — 25000132 ZZH RX MED GY IP 250 OP 250 PS 637: Performed by: HOSPITALIST

## 2019-01-23 PROCEDURE — 40000193 ZZH STATISTIC PT WARD VISIT: Performed by: PHYSICAL THERAPY ASSISTANT

## 2019-01-23 PROCEDURE — 97116 GAIT TRAINING THERAPY: CPT | Mod: GP | Performed by: PHYSICAL THERAPY ASSISTANT

## 2019-01-23 PROCEDURE — 99232 SBSQ HOSP IP/OBS MODERATE 35: CPT | Performed by: INTERNAL MEDICINE

## 2019-01-23 RX ADMIN — RANITIDINE 150 MG: 150 TABLET ORAL at 20:35

## 2019-01-23 RX ADMIN — POLYETHYLENE GLYCOL 3350 17 G: 17 POWDER, FOR SOLUTION ORAL at 08:16

## 2019-01-23 RX ADMIN — ACETAMINOPHEN 1000 MG: 500 TABLET, FILM COATED ORAL at 00:32

## 2019-01-23 RX ADMIN — SENNOSIDES AND DOCUSATE SODIUM 2 TABLET: 8.6; 5 TABLET ORAL at 20:35

## 2019-01-23 RX ADMIN — LISINOPRIL 20 MG: 20 TABLET ORAL at 20:35

## 2019-01-23 RX ADMIN — RANITIDINE 150 MG: 150 TABLET ORAL at 08:16

## 2019-01-23 RX ADMIN — SENNOSIDES AND DOCUSATE SODIUM 2 TABLET: 8.6; 5 TABLET ORAL at 08:17

## 2019-01-23 RX ADMIN — CITALOPRAM HYDROBROMIDE 10 MG: 10 TABLET ORAL at 20:35

## 2019-01-23 RX ADMIN — ASPIRIN 325 MG ORAL TABLET 325 MG: 325 PILL ORAL at 08:17

## 2019-01-23 RX ADMIN — ACETAMINOPHEN 1000 MG: 500 TABLET, FILM COATED ORAL at 15:37

## 2019-01-23 RX ADMIN — METHIMAZOLE 5 MG: 5 TABLET ORAL at 06:46

## 2019-01-23 RX ADMIN — ACETAMINOPHEN 1000 MG: 500 TABLET, FILM COATED ORAL at 08:16

## 2019-01-23 ASSESSMENT — ACTIVITIES OF DAILY LIVING (ADL)
ADLS_ACUITY_SCORE: 24

## 2019-01-23 NOTE — PROGRESS NOTES
[facility]    Medicine Progress Note - Hospitalist Service       Date of Admission:  1/12/2019  Assessment & Plan   Renetta Baker was admitted on 1/12/2019 by Chanell Painting MD and I would refer you to their history and physical.  The following problems were addressed during her hospitalization:  Renetta Baker is a 83 year old female with a past medical history significant for dementia, hypertension, hyperthyroidism, and depression who was admitted on 1/12/2019 after a fall sustaining multiple fractures.      Mechanical fall  Left distal radius fracture s/p reduction  Left proximal humerus fracture  Left superior/inferior pubic rami fractures.   Bilateral sacral alar fractures  - unwitnessed fall down two carpeted stairs and sustained injuries with above fractures  - orthopedics consulted and has splint on L writs and left upper extremity in a sling  - pain controlled with scheduled Tylenol and PRN oxycodone (not using)   -- WBAT lower extremities, NWB LUE; splint left UE  -- PT/OT following and rec TCU  -- She was suppose to follow up with up with ortho in 2 weeks (from 1/12), so family requesting follow up prior to discharge tomorrow. Also, complains of severe itching under arm wrap > ortho consulted and their input is appreciated.      Constipation, resolved   - likely sec to poor PO, limited ambulation and narcotics  - miralax daily and senna ordered.      Anemia, likely blood loss from fractures  - Per PCP records baseline hgb seems to be around 11-12. Hgb on admission 10.5, down to 7.6 on 1/14 and then stable around 7 through 1/18. She may have some bleeding related to arm and pelvic fractures. No mention of hematoma in pelvic CT read; not on any anticoagulation; no s/o signs of active bleeding during hers stay.   - start every other day iron   Recent Labs   Lab 01/18/19  0702 01/17/19  0639   HGB 7.6* 7.7*        Hypertension. Continue PTA lisinopril; BP  controlled     Hyperthyroidism. Continue PTA methimazole     Dementia/Depression. Continue PTA Celexa     History of urinary incontinence. Holding PTA Oxybutynin and tolerating well. Required intermittent straight cath. No longer requiring this on 01/23/19.      DVT Prophylaxis:  daily; PCDs  Code Status: Full Code      Diet: Regular Diet Adult  Room Service  Snacks/Supplements Pediatric: Magic Cup; Between Meals  Advance Diet as Tolerated      Chatterjee Catheter: not present    Disposition Plan   Expected discharge: Tomorrow, recommended to transitional care unit once pending placement per social work.  Place was found but need $6000 down payment from family, which they are unable to get until tomorrow.   Entered: Maite Hutton MD 01/23/2019, 2:26 PM       The patient's care was discussed with the Bedside Nurse and Patient's Family, daughter from [place] who had a lot of questions, which were answered.     Maite Hutton MD  Hospitalist Service  [facility]    ______________________________________________________________________    Interval History   Spoke with patient with daughter, who acted as . She is doing well, able to get up to the BR by herself today. Daughter asks if she could have orthopedist follow up here prior to discharge and patient has had a lot of itching under wrap over upper arm.     Data reviewed today: I reviewed all medications, new labs and imaging results over the last 24 hours. I personally reviewed no images or EKG's today.    Physical Exam   Vital Signs: Temp: 97.8  F (36.6  C) Temp src: Oral BP: 111/64 Pulse: 61   Resp: 16 SpO2: 99 % O2 Device: None (Room air)    Weight: 120 lbs 12.99 oz  Constitutional: NAD, frail  Neuropsyche:  alert and oriented, answers questions appropriately. Very sweet affect.   Respiratory:  Breathing comfortably, good air exchange, no wheezes, no crackles.   Cardiovascular:  Regular rate and rhythm, no edema.  GI:  soft,  NT/ND, BS normal  Skin/Integumen:  No acute rash, bruising or sign of bleeding. Skin around wrap looks normal without erythema or lesions.   MSK: bruising over right arm, L arm in sling. Able to bend, lift and straighten legs.       Data   Recent Labs   Lab 01/18/19  0702 01/17/19  0639   WBC  --  6.5   HGB 7.6* 7.7*   MCV  --  86   PLT  --  234   CR  --  0.58       No results found for this or any previous visit (from the past 24 hour(s)).  Medications       acetaminophen  1,000 mg Oral Q8H     aspirin  325 mg Oral Daily     citalopram  10 mg Oral QPM     ferrous sulfate  325 mg Oral Every Other Day     lisinopril  20 mg Oral QPM     methimazole  5 mg Oral QAM AC     polyethylene glycol  17 g Oral Daily     ranitidine  150 mg Oral BID     senna-docusate  1 tablet Oral BID    Or     senna-docusate  2 tablet Oral BID

## 2019-01-23 NOTE — PROGRESS NOTES
Consult received   Aware of patient and have discussed arm being itchy prior with patient and family  Likely secondary to cast padding and swelling, greatly improving (long arm splint)  May add atarax or benadryl, encouraged akil Herron PAC

## 2019-01-23 NOTE — PROGRESS NOTES
ADENIKE  D: Received call from [facility] regarding TCU referral. Pt cannot transfer to any [facility] facility with family staying 24/7. [facility] is willing to review referral if family agrees to only visit during the day. Met with pt and pt's dtr to discuss discharge plan. Discussed above information with dtr and she stated it wasn't an option to not have family there 24/7. SW explained if family was unwilling to only visit during the day then pt would need to discharge home with home care. Dtr was understanding of this and agreed to have pt go home with home care. SW gave dtr list and dtr stated she would call agencies to see about insurance coverage. Dtr also wanted information on renting a hospital bed. ADENIKE gave name of several places dtr could look at for hospital bed rental. SW explained pt does not have coverage for DME through her insurance and family would need to pay privately for it. Dtr was understanding. SW explained discharge orders are in so things would need to be arranged for pt to leave today. Dtr stated the dtr pt lives with is working until 1900 this evening so pt could not leave today. ADENIKE explained a later ride could be arranged. Dtr stated that wasn't enough time to arrange everything for pt. Dtr requested pt stay until tomorrow morning. SW explained she would talk to physician regarding request for pt to stay until tomorrow morning. SW will follow-up with pt and dtr after discussing request.  P: Will continue to follow and support a safe discharge plan    Alla Perez MSW, LGSW     ADDENDUM @1101: Met with pt and pt's dtr to confirm transportation for discharge home tomorrow and if any additional resources are needed for pt to return home. Dtr is requesting SW contact [facility] to discuss discharge plan. ADENIKE called and left VM for [facility].     ADDENDUM @1203: Received call from [facility]. [facility] is not willing or able to take pt home. [facility] is fine with TCU policy of not staying with pt  overnight. [facility] would like SW to find pt TCU bed. ADENIKE called [facility] to update on family being ok with not staying overnight with pt. ADENIKE called [facility]-they do not have a bed for pt. ADENIKE called [facility]-they can accept pt with a $6000 deposit. SW called [facility]-they can accept pt with a $6000 deposit for one month. ADENIKE called and left  for [facility] to see which facility she prefers.     ADDENDUM @1343: Received call from [facility]-upset that facility is requiring so much down. Derek stated she would not be able to get that amount until tomorrow. ADENIKE explained pt is medically stable so the very latest she could leave is tomorrow. [facility] understood and stated she'd make some calls to get that money together. ADENIKE called and left  for [facility] to see if they can still take pt tomorrow.     ADDENDUM @6327: [facility] can accept pt for admission tomorrow. Called and set-up ride via [employer] stretcher at 1100. PCS form completed and placed on front of chart-faxed to  billing. Received call from [facility]-in agreement for discharge to [facility] tomorrow. ADENIKE explained discharge time to [facility]. [facility] was in agreement. Completed PAS and placed on front of chart.    PAS-RR    D: Per DHS regulation, ADENIKE completed and submitted PAS-RR to [place] [employer] via the Senior [employer] Line.  PAS-RR confirmation # is : [id number]    I: ADENIKE spoke with pt and they are aware a PAS-RR has been submitted.  ADENIKE reviewed with pt that they may be contacted for a follow up appointment within 10 days of hospital discharge if their SNF stay is < 30 days.  Contact information for [facility] [employer] Line was also provided.    A: Pt verbalized understanding.    P: Further questions may be directed to [facility] [employer] Line at #[phone], option #4 for PAS-RR staff.

## 2019-01-23 NOTE — PLAN OF CARE
Pt A/O X4. VSS on RA. CMS intact. Family involved in communication. No IV access. Pt incontinent. Up with A2 GB. Pt denying pain. Continue to monitor.

## 2019-01-23 NOTE — PLAN OF CARE
Family present and assistive to patient. Translating for staff. Patient incontinent. Quinn carr to left arm dry and intact. Po fair.

## 2019-01-23 NOTE — PLAN OF CARE
Pt is A&O, family @ bedside 24 x7, assist with translation. Incontinent of urine. Repositioned per pt's comfort. Denies pain, taking  scheduled tylenol and ice pack. Discharge pending. Will continue to monitor.

## 2019-01-23 NOTE — PLAN OF CARE
Discharge Planner PT   Patient plan for discharge: Home with assist of family  Current status: Pt performed bed mobility with mod assist of 1 and increased time due to difficulty with scooting in bed.  Pt transferred sit to/from stand with mod assist and increased time to gain balance.  Pt performed gait training 16 ft x 1 and 7 ft x 1 using a hemiwalker on the right with min assist for balance and to advance hemiwalker for safety.  Pt amb slowly.  Pt tries to reach for hemiwalker with left UE at times and requires cues to maintain NWB on the left UE.  Pt needed to use the bathroom during session.  Min assist with transfer on/off commode over toilet and dependent for hygiene cares.  Barriers to return to prior living situation: Stairs, decreased mobility  Recommendations for discharge: TCU per plan established by the PT.  Rationale for recommendations: Pt has multiple fractures causing pain and mobility limitations due to NWB left UE and pain with weight bearing as tolerated in LE. Pt needs increased assist for all mobility and skilled care to improve mobility.             Entered by: Erin Finnegan 01/23/2019 12:10 PM

## 2019-01-24 ENCOUNTER — APPOINTMENT (OUTPATIENT)
Dept: GENERAL RADIOLOGY | Facility: CLINIC | Age: 84
End: 2019-01-24
Payer: COMMERCIAL

## 2019-01-24 VITALS
OXYGEN SATURATION: 97 % | HEART RATE: 63 BPM | DIASTOLIC BLOOD PRESSURE: 63 MMHG | RESPIRATION RATE: 16 BRPM | TEMPERATURE: 98 F | WEIGHT: 120.81 LBS | SYSTOLIC BLOOD PRESSURE: 107 MMHG

## 2019-01-24 PROCEDURE — 25000132 ZZH RX MED GY IP 250 OP 250 PS 637: Performed by: PHYSICIAN ASSISTANT

## 2019-01-24 PROCEDURE — 99232 SBSQ HOSP IP/OBS MODERATE 35: CPT | Performed by: INTERNAL MEDICINE

## 2019-01-24 PROCEDURE — 73110 X-RAY EXAM OF WRIST: CPT | Mod: LT

## 2019-01-24 PROCEDURE — 25000132 ZZH RX MED GY IP 250 OP 250 PS 637: Performed by: INTERNAL MEDICINE

## 2019-01-24 PROCEDURE — 25000132 ZZH RX MED GY IP 250 OP 250 PS 637: Performed by: HOSPITALIST

## 2019-01-24 PROCEDURE — 25000132 ZZH RX MED GY IP 250 OP 250 PS 637: Performed by: SURGERY

## 2019-01-24 PROCEDURE — 73030 X-RAY EXAM OF SHOULDER: CPT | Mod: LT

## 2019-01-24 RX ADMIN — SENNOSIDES AND DOCUSATE SODIUM 2 TABLET: 8.6; 5 TABLET ORAL at 08:07

## 2019-01-24 RX ADMIN — FERROUS SULFATE TAB 325 MG (65 MG ELEMENTAL FE) 325 MG: 325 (65 FE) TAB at 08:07

## 2019-01-24 RX ADMIN — ASPIRIN 325 MG ORAL TABLET 325 MG: 325 PILL ORAL at 08:07

## 2019-01-24 RX ADMIN — ACETAMINOPHEN 1000 MG: 500 TABLET, FILM COATED ORAL at 00:01

## 2019-01-24 RX ADMIN — METHIMAZOLE 5 MG: 5 TABLET ORAL at 08:07

## 2019-01-24 RX ADMIN — RANITIDINE 150 MG: 150 TABLET ORAL at 08:07

## 2019-01-24 RX ADMIN — POLYETHYLENE GLYCOL 3350 17 G: 17 POWDER, FOR SOLUTION ORAL at 08:07

## 2019-01-24 RX ADMIN — ACETAMINOPHEN 1000 MG: 500 TABLET, FILM COATED ORAL at 08:07

## 2019-01-24 ASSESSMENT — ACTIVITIES OF DAILY LIVING (ADL)
ADLS_ACUITY_SCORE: 24

## 2019-01-24 NOTE — PLAN OF CARE
Physical Therapy Discharge Summary    Reason for therapy discharge:    Discharged to transitional care facility.    Progress towards therapy goal(s). See goals on Care Plan in [facility] electronic health record for goal details.  Goals partially met.  Barriers to achieving goals:   discharge from facility.    Therapy recommendation(s):    Continued therapy is recommended.  Rationale/Recommendations:  Pt w/ hx of multiple fx, limited mobility 2/2 /NWB LUE and pain with WBAT in LE, needs assist for mobility and skilled care to optimize function.

## 2019-01-24 NOTE — DISCHARGE SUMMARY
[facility]  Hospitalist Discharge Summary       Date of Admission:  1/12/2019  Date of Discharge:  1/24/2019  Discharging Provider: Maite Hutton MD      Discharge Diagnoses   Mechanical fall  Left distal radius fracture s/p reduction  Left proximal humerus fracture  Left superior/inferior pubic rami fractures  Bilateral sacral alar fractures  Constipation and urinary retention, resolved   Anemia, acute blood loss    Chronic Med Problems  HTN  Hyperthyroidism  Dementia / Depression    Follow-ups Needed After Discharge   Follow-up Appointments     Follow Up and recommended labs and tests      Follow up with Dr. Herrera, orthopedic surgeon in 1 week. No follow up labs or test are needed prior to visit.  Call [facility] for appointment [phone]  Follow up with your PCP within 2 weeks with hgb, BMP, TSH.             Unresulted Labs Ordered in the Past 30 Days of this Admission     No orders found from 11/13/2018 to 1/13/2019.          Hospital Course      Renetta Baker was admitted on 1/12/2019 by Chanell Painting MD and I would refer you to their history and physical.  The following problems were addressed during her hospitalization:  Renetta Baker is a 83 year old female with a past medical history significant for dementia, hypertension, hyperthyroidism, and depression who was admitted on 1/12/2019 after a fall sustaining multiple fractures.      Mechanical fall  Left distal radius fracture s/p reduction  Left proximal humerus fracture  Left superior/inferior pubic rami fractures.   Bilateral sacral alar fractures  - unwitnessed fall down two carpeted stairs and sustained injuries with above fractures  - orthopedics consulted and has splint on L writs and left upper extremity in a sling  - pain controlled with scheduled Tylenol and PRN oxycodone (not using)   -- WBAT lower extremities, NWB LUE; splint left UE  -- PT/OT following and rec TCU  -- Ortho med with patient on the day of  discharge. X-ray showed stable alignment. Splint was changed due to itching.  -- F/u in 2-3 weeks. (previously planned for 1 week).      Constipation, resolved   - likely sec to poor PO, limited ambulation and narcotics  - miralax daily and senna ordered.      Anemia, likely blood loss from fractures  - Per PCP records baseline hgb seems to be around 11-12. Hgb on admission 10.5, down to 7.6 on 1/14 and then stable around 7 through 1/18. She may have some bleeding related to arm and pelvic fractures. No mention of hematoma in pelvic CT read; not on any anticoagulation; no s/o signs of active bleeding during hers stay.   - start every other day iron   Recent Labs   Lab 01/18/19  0702 01/17/19  0639   HGB 7.6* 7.7*        Hypertension. Continue PTA lisinopril; BP controlled     Hyperthyroidism. Continue PTA methimazole     Dementia/Depression. Continue PTA Celexa     History of urinary incontinence. Holding PTA Oxybutynin and tolerating well. Required intermittent straight cath. No longer requiring this on 01/23/19.      DVT Prophylaxis:  daily; PCDs  Code Status: Full Code     Consultations This Hospital Stay   PHYSICAL THERAPY ADULT IP CONSULT  ORTHOPEDIC SURGERY IP CONSULT  TRAUMA SURGERY IP CONSULT  SOCIAL WORK IP CONSULT  PHYSICAL THERAPY ADULT IP CONSULT  SOCIAL WORK IP CONSULT  PHYSICAL THERAPY ADULT IP CONSULT  OCCUPATIONAL THERAPY ADULT IP CONSULT  CARE TRANSITION RN/SW IP CONSULT  ORTHOPEDICS IP CONSULT    Code Status   Full Code    Time Spent on this Encounter   IMaite, personally saw the patient today and spent less than or equal to 30 minutes discharging this patient.       Maite Hutton MD  [facility]  ______________________________________________________________________    Physical Exam   Vital Signs: Temp: 98  F (36.7  C) Temp src: Oral BP: 107/63 Pulse: 63   Resp: 16 SpO2: 97 % O2 Device: None (Room air)    Weight: 120 lbs 12.99 oz  Constitutional: NAD,  frail  Neuropsyche:  alert and answers questions appropriately. Very sweet affect.   Respiratory:        Breathing comfortably, good air exchange, no wheezes, no crackles.   Cardiovascular:  Regular rate and rhythm, no edema.  GI:  soft, NT/ND, BS normal  Skin/Integumen:  No acute rash, bruising or sign of bleeding. Skin around wrap looks normal without erythema or lesions.   MSK: bruising over right arm, L arm in sling. Able to bend, lift and straighten legs.              Primary Care Physician   Isauro Fuller    Discharge Disposition   Discharged to short-term care facility  Condition at discharge: Good    Significant Results and Procedures   Most Recent 3 CBC's:  Recent Labs   Lab Test 01/18/19  0702 01/17/19  0639 01/16/19  0717  01/13/19  0638 01/12/19  1800   WBC  --  6.5  --   --  7.0 14.7*   HGB 7.6* 7.7* 7.7*   < > 8.5* 10.5*   MCV  --  86  --   --  86 87   PLT  --  234  --   --  161 223    < > = values in this interval not displayed.     Most Recent 3 BMP's:  Recent Labs   Lab Test 01/17/19  0639 01/13/19  0638 01/12/19  1800   NA  --  141 140   POTASSIUM  --  4.5 3.5   CHLORIDE  --  108 104   CO2  --  25 27   BUN  --  26 18   CR 0.58 0.85 0.66   ANIONGAP  --  8 9   VICENTE  --  7.7* 8.2*   GLC  --  131* 138*   ,   Results for orders placed or performed during the hospital encounter of 01/12/19   Elbow  XR, G/E 3 views, left    Narrative    ELBOW LEFT THREE OR MORE VIEWS   1/12/2019 5:56 PM     HISTORY: Fall, elbow pain.    COMPARISON: None.      Impression    IMPRESSION: Mild cortical irregularity along the radial head  laterally. This could just be degenerative change. It is difficult to  completely exclude a small fracture in this region. There are other  areas of degenerative osteophytes at the elbow.    KILO SIMPSON MD   XR Wrist Left G/E 3 Views    Narrative    WRIST LEFT THREE OR MORE VIEWS   1/12/2019 5:52 PM     HISTORY: Fall, wrist pain, deformity.    COMPARISON: None.      Impression    IMPRESSION:  Acute impacted fracture of the distal left radius. Mild  posterior angulation of the distal fragment. Acute fracture of the  ulnar styloid.    KILO SIMPSON MD   XR Pelvis w Hip Left 1 View    Narrative    PELVIS AND HIP LEFT ONE VIEW   1/12/2019 5:48 PM     HISTORY: Fall, hip pain.    COMPARISON: None.      Impression    IMPRESSION: There is cortical irregularity along the superior left  pubic ramus. As such, cannot exclude fracture at this location. Both  hips appear located. Left proximal femoral surgical change appears  anatomic without evidence for hardware failure.    KILO SIMPSON MD   XR Shoulder Left 2 Views    Narrative    SHOULDER TWO VIEW LEFT   1/12/2019 5:50 PM     HISTORY: Fall, shoulder pain.    COMPARISON: None.      Impression    IMPRESSION: Acute fracture through the proximal humeral metadiaphysis  and humeral head. Impacted fracture fragments. No convincing  dislocation.    KILO SIMPSON MD   XR Wrist Left 2 Views    Addendum: 1/13/2019    The lateral image was corrupted when it was sent for the first time.  The lateral image was resent. Lateral image is now interpretable and  again demonstrates the radial head fracture. There are now appears to  be slight volar angulation of the distal radius compared to x-ray from  earlier the same day.    KATY QUEZADA MD      Narrative    WRIST LEFT TWO VIEW 1/12/2019 9:10 PM     HISTORY: Status post reduction.    COMPARISON: Earlier the same day.      Impression    IMPRESSION: New casting material over the left wrist limits  evaluation. The lateral view is nondiagnostic.    KATY QUEZADA MD   CT Pelvis Bone wo Contrast    Narrative    CT PELVIS BONE WITHOUT CONTRAST  1/14/2019 9:21 PM     HISTORY:  Abdomen-pelvis trauma, minor, blunt. Pubic rami fracture on  plain films, unable to bear weight.  Rule out additional occult  fracture.    Radiation dose for this scan was reduced using automated exposure  control, adjustment of the mA and/or kV according to patient  size, or  iterative reconstruction technique.    FINDINGS: There is a comminuted minimally displaced left superior  pubic ramus fracture. Also noted is a nondisplaced fracture involving  the medial aspect of the left inferior pubic ramus. Subtle cortical  disruption is noted in the inferolateral aspect of both sacral ala,  indicating bilateral sacral alar nondisplaced fractures. Advanced  apophyseal joint degenerative arthrosis is noted in the lower lumbar  spine. Dynamic compression screw and sideplate are noted in the  proximal aspect of the left femur without evidence of acute left  femoral fracture. The right proximal femur also appears intact. Hip  joint space width appears grossly normal.      Impression    IMPRESSION: Left superior/inferior pubic rami acute-appearing  fractures. Bilateral sacral alar acute-appearing fractures.    PARI LI MD   XR Wrist Left G/E 3 Views    Narrative    LEFT WRIST THREE OR MORE VIEWS   1/24/2019 11:50 AM     HISTORY: Known distal radius fracture, 12 day follow up. Leaving  today.    COMPARISON: Left wrist x-ray 1/12/2019.      Impression    IMPRESSION: Casting material overlies the bones of the left wrist  obscuring fine details. No obvious change in the alignment of the  impacted distal radial fracture. Follow-up as clinically warranted.    PEDRO WILLIS MD   XR Shoulder Left 2 Views    Narrative    LEFT SHOULDER TWO VIEWS  1/24/2019 11:52 AM     HISTORY: Known proximal humerus fracture, 12 day follow up, leaving  today.    COMPARISON: Left shoulder x-ray 1/12/2019.      Impression    IMPRESSION: Two views of the left shoulder are performed. Humeral head  appears located but there is a displaced and probable impacted  proximal humeral fracture which is slightly more impacted when  compared to prior exam. Distal fracture fragment also appears to be  anteriorly displaced with respect to the humeral head.    PEDRO WILLIS MD       Discharge Orders      Weight bearing status     WBAT bilateral LE with walker - likely will not tolerate long distances for 4-6 weeks  Non-WB left UE - keep splint and sling intact     General info for SNF    Length of Stay Estimate: Short Term Care: Estimated # of Days 31-90  Condition at Discharge: Improving  Level of care:skilled   Rehabilitation Potential: Good  Admission H&P remains valid and up-to-date: Yes  Recent Chemotherapy: N/A  Use Nursing Home Standing Orders: Yes     Mantoux instructions    Give two-step Mantoux (PPD) Per Facility Policy Yes     Reason for your hospital stay    Multiple fractures     Activity - Up with nursing assistance     Follow Up and recommended labs and tests    Follow up with Dr. Herrera, orthopedic surgeon in 2-3 weeks, originally Ordered as 1 week. No follow up labs or test are needed prior to visit.  Call [facility] for appointment [phone]  Follow up with your PCP within 2 weeks with hgb, BMP, TSH.     Full Code     Physical Therapy Adult Consult    Evaluate and treat as clinically indicated.    Reason:  Multiple fractures     Occupational Therapy Adult Consult    Evaluate and treat as clinically indicated.    Reason:  Multiple fractures     Fall precautions     Fall precautions     Advance Diet as Tolerated    Follow this diet upon discharge: Orders Placed This Encounter      Room Service      Snacks/Supplements Pediatric: Magic Cup; Between Meals      Regular Diet Adult     Discharge Medications   Current Discharge Medication List      START taking these medications    Details   acetaminophen (TYLENOL) 500 MG tablet Take 2 tablets (1,000 mg) by mouth every 8 hours  Qty: 180 tablet, Refills: 0    Associated Diagnoses: Closed fracture of ramus of left pubis, initial encounter (H); Closed fracture of distal end of left radius, unspecified fracture morphology, initial encounter; Closed fracture of distal end of left ulna, unspecified fracture morphology, initial encounter; Multiple fractures      aspirin (ASA) 325 MG  tablet Take 1 tablet (325 mg) by mouth daily  Qty: 30 tablet, Refills: 0    Associated Diagnoses: Multiple fractures      ferrous sulfate (FE TABS) 325 (65 Fe) MG EC tablet Take 1 tablet (325 mg) by mouth every other day  Qty: 15 tablet, Refills: 0    Associated Diagnoses: Anemia due to blood loss, acute      melatonin 1 MG TABS tablet Take 1 tablet (1 mg) by mouth nightly as needed for sleep  Qty: 15 tablet, Refills: 0    Associated Diagnoses: Closed fracture of proximal end of left humerus, unspecified fracture morphology, initial encounter; Closed fracture of ramus of left pubis, initial encounter (H); Closed fracture of distal end of left radius, unspecified fracture morphology, initial encounter; Closed fracture of distal end of left ulna, unspecified fracture morphology, initial encounter; Multiple fractures      order for DME Equipment being ordered: Other: Hemiwalker  Treatment Diagnosis: Impaired gait, NWB left UE  Qty: 1 each, Refills: 0    Associated Diagnoses: Multiple fractures      polyethylene glycol (MIRALAX/GLYCOLAX) packet Take 17 g by mouth 2 times daily as needed for constipation  Qty: 30 packet, Refills: 0    Associated Diagnoses: Drug-induced constipation      ranitidine (ZANTAC) 150 MG tablet Take 1 tablet (150 mg) by mouth 2 times daily  Qty: 60 tablet, Refills: 0    Associated Diagnoses: Nausea and vomiting, intractability of vomiting not specified, unspecified vomiting type      sennosides (SENOKOT) 8.6 MG tablet Take 2 tablets by mouth daily While using narcotics  Qty: 60 tablet, Refills: 0    Associated Diagnoses: Multiple fractures         CONTINUE these medications which have NOT CHANGED    Details   ALENDRONATE SODIUM PO Take by mouth once a week      citalopram (CELEXA) 10 MG tablet Take 10 mg by mouth every evening       lisinopril (PRINIVIL/ZESTRIL) 20 MG tablet Take 20 mg by mouth every evening       methimazole (TAPAZOLE) 5 MG tablet Take 5 mg by mouth every morning (before  breakfast)          STOP taking these medications       oxybutynin (DITROPAN) 5 MG tablet Comments:   Reason for Stopping:             Allergies   No Known Allergies

## 2019-01-24 NOTE — PROGRESS NOTES
ADENIKE  D: Received update pt needs to be seen by Ortho prior to discharge. Called [facility] and moved ride to 1400. Called [facility] and updated on discharge time being moved to 1400.   P: Will continue to follow and support a safe discharge plan    Alla WEINSTEIN, DEBBIESW

## 2019-01-24 NOTE — PLAN OF CARE
VSS, up with two assist, incontinent of urine, dressing CDI, denied any pain, discharge to TCU at 1400.

## 2019-01-24 NOTE — PROGRESS NOTES
Met with daughter and patient  Reviewed x-rays - stable alignment    Continue splint, continue sling    Follow-up in clinic in 2-3 weeks for recheck and splint change  Alla Herron PAC

## 2019-01-24 NOTE — PLAN OF CARE
VSS, up with two assist, voiding in b.r/incontinent at times, denied any pain, Pending placement, will continue to monitor.

## 2019-02-26 NOTE — PROGRESS NOTES
[facility]    Hospitalist Progress Note      Assessment & Plan   Renetta Baker is a 83 year old female with a past medical history significant for dementia, hypertension, hyperthyroidism, and depression who was admitted on 1/12/2019 after a fall sustaining multiple fractures.      # Mechanical fall  # Left distal radius fracture s/p reduction  # Left proximal humerus fracture  # Left superior/inferior pubic rami fractures.   # Bilateral sacral alar fractures    - unwitnessed fall down two carpeted stairs and sustained injuries with above fractures  - orthopedics following  - left upper extremity in a sling  - pain controlled with scheduled Tylenol and PRN oxycodone  - marked swelling LUE sec to fracture, no s/o compartment syndrome  -- WBAT lower extremities, NWB LUE; splint left UE  - PT/OT following and rec TCU but patient apparently doesn't have insurance to cover for TCU  - SW following; family agrees to private pay for TCU; planned discharge to TCU in am  - to follow up with up with ortho in 2 weeks    # Constipation  - likely sec to poor PO, limited ambulation and narcotics  - bowel regimen ordered  - trying prune juice; can try suppository this afternoon with no results     # Anemia.   - Per PCP records baseline hgb seems to be around 11-12. Hgb on admission 10.5, down to 7.6 on 1/14 and then stable around 7  - Unclear source though suspect she may have some bleeding related to arm and pelvic fractures. No mention of hematoma in pelvic CT read; not on any anticoagulation; no s/o active bleed  - will monitor Hb    # Hypertension. Continue prior to admission lisinopril; BP controlled     # Hyperthyroidism. Continue prior to admission methimazole     # Dementia/Depression. Continue prior to admission Celexa     # History of urinary incontinence. Holding prior to admission Oxybutynin with concurrent use of opioids to help prevent delirium; getting intermittent straight cath    DVT  Prophylaxis:  daily; PCDs  Code Status: Full Code    Disposition: planned discharge to TCU in     [place]    Interval History   - talked to patient with her daughter interpreting ; no acute issues overnight; did get out of bed to chair with two person assist; reports no bowel movement in two days    -Data reviewed today: I reviewed all new labs and imaging results over the last 24 hours    Physical Exam                      Vitals:    01/12/19 2030 01/16/19 0653   Weight: 51.7 kg (113 lb 14.4 oz) 54.8 kg (120 lb 13 oz)     Vital Signs with Ranges     No intake/output data recorded.    Constitutional: Appears comfortable, laying down in bed. Communicates through family.; no apparent distress; very pleasant   ENT: moist mucous membranes  Respiratory: Lungs clear to auscultation bilaterally, no increased work of breathing or wheezing  Cardiovascular: Regular rate and rhythm, no murmur, no LE edema  GI:positive bowel sounds, abdomen soft, non-tender  Skin/Integumen: warm, dry. Left upper extremity with significant swelling with posterior ecchymosis.    MSK:  Calves are non-tender. Intact dorsi and plantar flexion bilaterally. Hip flexion intact. Splint and sling in place LUE. Fingers of LUE are warm, patient can wiggle.   Neuro:  Sensation in extremities intact. Face symmetric. No focal deficits.       Medications         Data   No lab results found in last 7 days.    No results found for this or any previous visit (from the past 24 hour(s)).

## 2022-10-22 ENCOUNTER — APPOINTMENT (OUTPATIENT)
Dept: GENERAL RADIOLOGY | Facility: CLINIC | Age: 87
End: 2022-10-22
Attending: EMERGENCY MEDICINE
Payer: COMMERCIAL

## 2022-10-22 ENCOUNTER — HOSPITAL ENCOUNTER (EMERGENCY)
Facility: CLINIC | Age: 87
Discharge: HOME | End: 2022-10-22
Attending: EMERGENCY MEDICINE | Admitting: EMERGENCY MEDICINE
Payer: COMMERCIAL

## 2022-10-22 ENCOUNTER — APPOINTMENT (OUTPATIENT)
Dept: CT IMAGING | Facility: CLINIC | Age: 87
End: 2022-10-22
Attending: EMERGENCY MEDICINE
Payer: COMMERCIAL

## 2022-10-22 VITALS
BODY MASS INDEX: 21.66 KG/M2 | HEART RATE: 69 BPM | SYSTOLIC BLOOD PRESSURE: 155 MMHG | DIASTOLIC BLOOD PRESSURE: 73 MMHG | RESPIRATION RATE: 16 BRPM | WEIGHT: 130 LBS | HEIGHT: 65 IN | OXYGEN SATURATION: 98 % | TEMPERATURE: 97.4 F
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
## 2022-10-22 DIAGNOSIS — S32.511A CLOSED FRACTURE OF SUPERIOR RAMUS OF RIGHT PUBIS, INITIAL ENCOUNTER (H): ICD-10-CM

## 2022-10-22 LAB
ANION GAP SERPL CALCULATED.3IONS-SCNC: 8 MMOL/L (ref 3–14)
BASOPHILS # BLD AUTO: 0.1 10E3/UL (ref 0–0.2)
BASOPHILS NFR BLD AUTO: 1 %
BUN SERPL-MCNC: 14 MG/DL (ref 7–30)
CALCIUM SERPL-MCNC: 8.9 MG/DL (ref 8.5–10.1)
CHLORIDE BLD-SCNC: 103 MMOL/L (ref 94–109)
CO2 SERPL-SCNC: 28 MMOL/L (ref 20–32)
CREAT SERPL-MCNC: 0.66 MG/DL (ref 0.52–1.04)
EOSINOPHIL # BLD AUTO: 0.2 10E3/UL (ref 0–0.7)
EOSINOPHIL NFR BLD AUTO: 2 %
ERYTHROCYTE [DISTWIDTH] IN BLOOD BY AUTOMATED COUNT: 15.1 % (ref 10–15)
GFR SERPL CREATININE-BSD FRML MDRD: 85 ML/MIN/1.73M2
GLUCOSE BLD-MCNC: 122 MG/DL (ref 70–99)
HCT VFR BLD AUTO: 38.1 % (ref 35–47)
HGB BLD-MCNC: 11.9 G/DL (ref 11.7–15.7)
IMM GRANULOCYTES # BLD: 0 10E3/UL
IMM GRANULOCYTES NFR BLD: 0 %
LYMPHOCYTES # BLD AUTO: 1.5 10E3/UL (ref 0.8–5.3)
LYMPHOCYTES NFR BLD AUTO: 13 %
MCH RBC QN AUTO: 26.9 PG (ref 26.5–33)
MCHC RBC AUTO-ENTMCNC: 31.2 G/DL (ref 31.5–36.5)
MCV RBC AUTO: 86 FL (ref 78–100)
MONOCYTES # BLD AUTO: 0.6 10E3/UL (ref 0–1.3)
MONOCYTES NFR BLD AUTO: 6 %
NEUTROPHILS # BLD AUTO: 8.6 10E3/UL (ref 1.6–8.3)
NEUTROPHILS NFR BLD AUTO: 78 %
NRBC # BLD AUTO: 0 10E3/UL
NRBC BLD AUTO-RTO: 0 /100
PLATELET # BLD AUTO: 331 10E3/UL (ref 150–450)
POTASSIUM BLD-SCNC: 3.9 MMOL/L (ref 3.4–5.3)
RBC # BLD AUTO: 4.42 10E6/UL (ref 3.8–5.2)
SODIUM SERPL-SCNC: 139 MMOL/L (ref 133–144)
WBC # BLD AUTO: 11 10E3/UL (ref 4–11)

## 2022-10-22 PROCEDURE — 36415 COLL VENOUS BLD VENIPUNCTURE: CPT | Performed by: EMERGENCY MEDICINE

## 2022-10-22 PROCEDURE — 99285 EMERGENCY DEPT VISIT HI MDM: CPT | Mod: 25

## 2022-10-22 PROCEDURE — 80048 BASIC METABOLIC PNL TOTAL CA: CPT | Performed by: EMERGENCY MEDICINE

## 2022-10-22 PROCEDURE — 250N000013 HC RX MED GY IP 250 OP 250 PS 637: Performed by: EMERGENCY MEDICINE

## 2022-10-22 PROCEDURE — 72192 CT PELVIS W/O DYE: CPT

## 2022-10-22 PROCEDURE — 27197 CLSD TX PELVIC RING FX: CPT

## 2022-10-22 PROCEDURE — 85025 COMPLETE CBC W/AUTO DIFF WBC: CPT | Performed by: EMERGENCY MEDICINE

## 2022-10-22 PROCEDURE — 73502 X-RAY EXAM HIP UNI 2-3 VIEWS: CPT

## 2022-10-22 RX ORDER — ACETAMINOPHEN 500 MG
1000 TABLET ORAL ONCE
Status: COMPLETED | OUTPATIENT
Start: 2022-10-22 | End: 2022-10-22

## 2022-10-22 RX ADMIN — ACETAMINOPHEN 1000 MG: 500 TABLET ORAL at 12:57

## 2022-10-22 ASSESSMENT — ENCOUNTER SYMPTOMS
DYSURIA: 0
FEVER: 0
VOMITING: 0
ARTHRALGIAS: 1

## 2022-10-22 ASSESSMENT — ACTIVITIES OF DAILY LIVING (ADL)
ADLS_ACUITY_SCORE: 39
ADLS_ACUITY_SCORE: 39

## 2022-10-22 NOTE — ED NOTES
Rapid Assessment Note    History:   Patient's daughter reports that the patient had a fall this morning at 0330 while going to the bathroom. Daughter did not witness this fall, as she was sleeping, but she did wake up and find the patient lying on her right side on the ground. Since then the patient has had right groin pain and difficulty ambulating. No known head injury or loss of consciousness. She has been unable to walk since the fall due to right groin pain and her pain is exacerbated when she stands. No fevers. No change in baseline mentation. No new weakness. No blood thinner use. Patient does not speak English and has known dementia.     Exam:   General:  Alert, interactive  Cardiovascular:  Well perfused  Lungs:  No respiratory distress, no accessory muscle use  Neuro:  Moving all 4 extremities  Skin:  Warm, dry  Psych:  Normal affect  MUSCULOSKELETAL: Pain with internal/external rotation of the right hip and pain on axial loading of the right lower extremity. Normal extension and flexion of the knee without pain.     Plan of Care:   I evaluated the patient and developed an initial plan of care. I discussed this plan and explained that I, or one of my partners, would be returning to complete the evaluation.     I, Johnathan Pleitez, am serving as a scribe to document services personally performed by Charlie Bautista MD, based on my observations and the provider's statements to me.    10/22/22   [employer]    Portions of this medical record were completed by a scribe. UPON MY REVIEW AND AUTHENTICATION BY ELECTRONIC SIGNATURE, this confirms (a) I performed the applicable clinical services, and (b) the record is accurate.      Charlie Bautista MD  10/22/22 8504

## 2022-10-22 NOTE — ED TRIAGE NOTES
Fell 0330 going to BR , found laying on right side, has right hip, pelvis pain, non english speaking, dementia

## 2022-10-22 NOTE — ED PROVIDER NOTES
"  History   Chief Complaint:  Fall     The history is provided by a relative. The history is limited by a language barrier and the condition of the patient. No  was used (Daughter interpreted when necessary).      Renetta Baker is an 86 year old female with dementia who presents for evaluation following a fall. Renetta's daughter remarks the patient had a fall in the middle of the night about 6 hours prior to arrival. Her daughter heard the fall and found Renetta on the floor of the bathroom on her right side. Her mother started complaining of pain in her rip hip and groin. Renetta took Tylenol and went back to bed. This morning, about 3 hours ago, her daughter tried to help her to the bathroom and noticed Renetta could not put any weight on her leg. She otherwise has no complaints and has recently been in her baseline state of health. Her daughter denies fever, vomiting, or dysuria. She is certain her mother did not hit her head in the fall.     Review of Systems   Unable to perform ROS: Dementia   Constitutional: Negative for fever.   Gastrointestinal: Negative for vomiting.   Genitourinary: Negative for dysuria.   Musculoskeletal: Positive for arthralgias (right hip) and gait problem.   Skin: Negative for wound.   Neurological: Negative for headaches.   Psychiatric/Behavioral: Positive for confusion (baseline dementia).     Allergies:  The patient has no known allergies.     Medications:  Alendronate   Celexa  Lisinopril  Tapazole    Past Medical History:     Dementia  HTN  Hyperthyroidism     Social History:  Patient is accompanied in the ED by her daughter.  Patient lives her daughter and her daughter's .  Patient walks with two canes at baseline.  Patient is from [place] and lives in [place] for four months of the year.    Physical Exam     Patient Vitals for the past 24 hrs:   BP Temp Temp src Pulse Resp SpO2 Height Weight   10/22/22 1248 -- -- -- -- -- -- 1.651 m (5' 5\") 59 " kg (130 lb)   10/22/22 1033 (!) 155/73 97.4  F (36.3  C) Temporal 69 16 98 % -- --       Physical Exam   General: Well-developed and well-nourished. Well appearing elderly woman. Cooperative.  Head:  Atraumatic.  Eyes:  Conjunctivae, lids, and sclerae are normal.  Neck:  Supple. Normal range of motion. No midline tenderness.  CV:  Regular rate and rhythm. Normal heart sounds with no murmurs, rubs, or gallops detected.  Resp:  No respiratory distress. Clear to auscultation bilaterally without decreased breath sounds, wheezing, rales, or rhonchi.  GI:  Soft. Non-distended. Non-tender.    MS:  Pain with ROM of the right hip without lateral tenderness. No tenderness over the lumbar spinous processes.  Skin:  Warm. Non-diaphoretic. No pallor.  Neuro:  Awake and alert. Normal strength and sensation throughout the right lower extremity.  Psych: Normal mood and affect.  Vitals reviewed.    Emergency Department Course     Imaging:  CT Pelvis Bone wo Contrast   Final Result   IMPRESSION:   1.  Acute nondisplaced right superior pubic ramus fracture.      2.  A right inferior pubic ramus fracture is also suspected, but one is not definitely visualized. An occult inferior pubic ramus fracture is possible.      3.  Additional acute fracture in the hips or pelvis.      4.  Normal alignment of the pelvic ring. Normal hip joint alignment.      5.  Well-healed old left superior and inferior pubic rami fractures. Well-healed old left proximal femur fracture status post operative fixation of the dynamic hip screw.      6.  Generalized demineralization. No concerning lytic or sclerotic bone lesions.      7.  Small amount of fluid/blood products in the extraperitoneal spaces of the anterior right pelvis. No significant hematoma.      8.  Moderate generalized atrophy of the pelvic musculature. No additional acute myotendinous or other soft tissue process identified.      XR Pelvis w Hip Right 1 View   Final Result   IMPRESSION: Previous  internal fixation of healed proximal left femur fracture and old healed left tibia rami fractures. No acute fracture or dislocation. Mild degenerative arthritis of both hip joints. Degenerative changes in lumbar spine.        Report per radiology    Laboratory:  Labs Ordered and Resulted from Time of ED Arrival to Time of ED Departure   BASIC METABOLIC PANEL - Abnormal       Result Value    Sodium 139      Potassium 3.9      Chloride 103      Carbon Dioxide (CO2) 28      Anion Gap 8      Urea Nitrogen 14      Creatinine 0.66      Calcium 8.9      Glucose 122 (*)     GFR Estimate 85     CBC WITH PLATELETS AND DIFFERENTIAL - Abnormal    WBC Count 11.0      RBC Count 4.42      Hemoglobin 11.9      Hematocrit 38.1      MCV 86      MCH 26.9      MCHC 31.2 (*)     RDW 15.1 (*)     Platelet Count 331      % Neutrophils 78      % Lymphocytes 13      % Monocytes 6      % Eosinophils 2      % Basophils 1      % Immature Granulocytes 0      NRBCs per 100 WBC 0      Absolute Neutrophils 8.6 (*)     Absolute Lymphocytes 1.5      Absolute Monocytes 0.6      Absolute Eosinophils 0.2      Absolute Basophils 0.1      Absolute Immature Granulocytes 0.0      Absolute NRBCs 0.0          Emergency Department Course:  Reviewed:  I reviewed nursing notes, vitals, and past medical history.    Assessments:  1239 I obtained history and examined the patient as noted above.   1433 I rechecked the patient and explained findings. Renetta's daughter does not want her mother hospitalized due to insurance not covering it. I discussed risks of discharge and she wanted to proceed with discharge.     Interventions:  1257 Tylenol 1000 mg PO    Disposition:  The patient was discharged with daughter.     Impression & Plan   Medical Decision Making:  Renetta is an 86 year old woman with dementia who had a fall in the middle of the night.  Her daughter heard the fall and immediately went to her side.  She was on her right side and both parties deny that she  hit her head.  Her only complaint is right hip pain radiating to her right groin making weightbearing difficult.  Fortunately, she appears well on exam.  She does not have significant lateral hip tenderness but does have decreased range of motion due to pain.  She is neurovascularly intact.  There is no other external evidence of trauma.  X-ray of the pelvis and hip revealed no fracture or malalignment.  Given concern for pelvic fracture and her difficulty bearing weight, she was sent for CT scan of the pelvic bones.  This confirms an acute fracture of the right superior pubic ramus and suspected right inferior pubic ramus fracture with a small amount of fluid or blood products in the extraperitoneal spaces of the right anterior pelvis without significant hematoma.  Fortunately, her laboratory studies are without leukocytosis, anemia, kidney injury, or metabolic derangement.  She declined any narcotics outside of Tylenol.    I recommended admission for [facility] as she will need pain control as well as PT and OT and likely TCU placement.  However, her daughter declines admission because of insurance issues (reportedly her insurance only pays for emergency visits outside of the state of [place]).  Her daughter has everything she needs at home to help her mother with activities of daily living (getting to the bathroom, e.g.) and actually plans to take her mother to [place] where her insurance is valid.  Renetta continued to decline any stronger pain medications.  We did discuss Tylenol as needed at home as well as occasional ibuprofen if the pain is more severe.  We discussed if she is going to fly to [place] she should get up and move around as much as possible as both her pelvic fractures and airplane travel have increased risk for DVT.  Her daughter verbalizes understanding of increased VTE risk.  Her daughter does understand she is to bring Renetta back to the ER if she has uncontrolled pain, new symptoms or  injuries, or if they change her mind regarding admission.  All questions answered.  Discharged at their request, declining offer for admission.    Diagnosis:    ICD-10-CM    1. Closed fracture of superior ramus of right pubis, initial encounter (H)  S32.511A       2. Fall at home, initial encounter  W19.XXXA     Y92.009           Discharge Medications:  Discharge Medication List as of 10/22/2022  2:57 PM          Scribe Disclosure:  I, Marian Yee, am serving as a scribe at 12:39 PM on 10/22/2022 to document services personally performed by Danay Benson MD based on my observations and the provider's statements to me.        Danay Benson MD  11/07/22 0509

## 2022-10-22 NOTE — DISCHARGE INSTRUCTIONS
Tylenol as needed for pain.  You could use ibuprofen on occasion if the pain is more severe.  Recommendation is typically for hospitalization for pain control and physical therapy.  Because she cannot be admitted here consider seeking care elsewhere.  If she is going to fly I would try to get her up and move around as broken bones and airplane rides both increase risk for blood clots in the legs.  Return immediately if you notice she has too significant of pain, worsening confusion, fever, other injuries, or if you change your mind regarding admission here.

## 2022-10-24 ENCOUNTER — PATIENT OUTREACH (OUTPATIENT)
Dept: CARE COORDINATION | Facility: CLINIC | Age: 87
End: 2022-10-24

## 2022-10-24 NOTE — PROGRESS NOTES
Clinic Care Coordination Contact    Situation: Patient chart reviewed by care coordinator.    Background: Pt at home and fell in the bathroom, complaining of hip pain.    Assessment:  Acute nondisplaced right superior pubic ramus fracture.       2.  A right inferior pubic ramus fracture is also suspected, but one is not definitely visualized. An occult inferior pubic ramus fracture is possible.       3.  Additional acute fracture in the hips or pelvis.       4.  Normal alignment of the pelvic ring. Normal hip joint alignment.       5.  Well-healed old left superior and inferior pubic rami fractures. Well-healed old left proximal femur fracture status post operative fixation of the dynamic hip screw.       6.  Generalized demineralization. No concerning lytic or sclerotic bone lesions.       7.  Small amount of fluid/blood products in the extraperitoneal spaces of the anterior right pelvis. No significant hematoma.       8.  Moderate generalized atrophy of the pelvic musculature. No additional acute myotendinous or other soft tissue process identified.       XR Pelvis w Hip Right 1 View   Final Result   IMPRESSION: Previous internal fixation of healed proximal left femur fracture and old healed left tibia rami fractures. No acute fracture or dislocation. Mild degenerative arthritis of both hip joints. Degenerative changes in lumbar spine.             Plan/Recommendations: pt to follow up with pcp. [facility] shared information with primary clinic for follow up . [facility] will not do any additional outreaches.    LEIDA Rios  , Care Coordination  [facility]  [phone]  [email or web address]

## 2022-11-04 ASSESSMENT — ENCOUNTER SYMPTOMS
CONFUSION: 1
HEADACHES: 0
WOUND: 0

## 2024-08-13 NOTE — PROGRESS NOTES
Alert. Farsi speaking. Declined , daughter-in-law and later son with patient who speaks english. L arm/wrist in splint and sling. Pain controlled with tylenol, regular diet, requests female aids only, incontinent urine, NWB LUE, WBAT LLE, continue to monitor   Concentration (Mg/Ml): 40.0 Total Volume (Ccs): 1.5 Detail Level: Simple Expiration Date (Optional): 01/2026 Lot # (Optional): [id number] Ndc# (Optional): 3525-4956-27 Concentration (Mg/Ml) Of Additional Medication: 2.5 Consent: The risks of atrophy were reviewed with the patient. Kenalog Preparation: kenalog Add Option For Additional Mediation: No Administered By (Optional): LORI Giles